# Patient Record
Sex: FEMALE | Race: WHITE | NOT HISPANIC OR LATINO | Employment: OTHER | URBAN - METROPOLITAN AREA
[De-identification: names, ages, dates, MRNs, and addresses within clinical notes are randomized per-mention and may not be internally consistent; named-entity substitution may affect disease eponyms.]

---

## 2017-02-17 ENCOUNTER — OFFICE VISIT - HEALTHEAST (OUTPATIENT)
Dept: FAMILY MEDICINE | Facility: CLINIC | Age: 77
End: 2017-02-17

## 2017-02-17 DIAGNOSIS — I10 ESSENTIAL HYPERTENSION, BENIGN: ICD-10-CM

## 2017-02-17 DIAGNOSIS — J06.9 URI (UPPER RESPIRATORY INFECTION): ICD-10-CM

## 2017-05-20 ENCOUNTER — COMMUNICATION - HEALTHEAST (OUTPATIENT)
Dept: FAMILY MEDICINE | Facility: CLINIC | Age: 77
End: 2017-05-20

## 2017-05-20 DIAGNOSIS — I10 UNSPECIFIED ESSENTIAL HYPERTENSION: ICD-10-CM

## 2017-05-20 DIAGNOSIS — I10 HTN (HYPERTENSION): ICD-10-CM

## 2017-05-23 ENCOUNTER — COMMUNICATION - HEALTHEAST (OUTPATIENT)
Dept: FAMILY MEDICINE | Facility: CLINIC | Age: 77
End: 2017-05-23

## 2017-06-14 ENCOUNTER — OFFICE VISIT - HEALTHEAST (OUTPATIENT)
Dept: FAMILY MEDICINE | Facility: CLINIC | Age: 77
End: 2017-06-14

## 2017-06-14 DIAGNOSIS — M81.0 OSTEOPOROSIS: ICD-10-CM

## 2017-06-14 DIAGNOSIS — I10 ESSENTIAL HYPERTENSION, BENIGN: ICD-10-CM

## 2017-06-14 DIAGNOSIS — Z85.3 HISTORY OF BREAST CANCER: ICD-10-CM

## 2017-06-14 DIAGNOSIS — K21.9 ESOPHAGEAL REFLUX: ICD-10-CM

## 2017-06-14 DIAGNOSIS — E78.00 PURE HYPERCHOLESTEROLEMIA: ICD-10-CM

## 2017-06-14 DIAGNOSIS — Z12.11 COLON CANCER SCREENING: ICD-10-CM

## 2017-06-14 ASSESSMENT — MIFFLIN-ST. JEOR: SCORE: 1167.16

## 2017-07-25 ENCOUNTER — RECORDS - HEALTHEAST (OUTPATIENT)
Dept: ADMINISTRATIVE | Facility: OTHER | Age: 77
End: 2017-07-25

## 2017-08-04 ENCOUNTER — COMMUNICATION - HEALTHEAST (OUTPATIENT)
Dept: FAMILY MEDICINE | Facility: CLINIC | Age: 77
End: 2017-08-04

## 2017-08-10 ENCOUNTER — AMBULATORY - HEALTHEAST (OUTPATIENT)
Dept: FAMILY MEDICINE | Facility: CLINIC | Age: 77
End: 2017-08-10

## 2017-08-10 DIAGNOSIS — Z12.11 COLON CANCER SCREENING: ICD-10-CM

## 2017-08-23 ENCOUNTER — COMMUNICATION - HEALTHEAST (OUTPATIENT)
Dept: FAMILY MEDICINE | Facility: CLINIC | Age: 77
End: 2017-08-23

## 2017-08-23 DIAGNOSIS — I10 UNSPECIFIED ESSENTIAL HYPERTENSION: ICD-10-CM

## 2017-08-23 DIAGNOSIS — I10 HTN (HYPERTENSION): ICD-10-CM

## 2017-10-04 ENCOUNTER — RECORDS - HEALTHEAST (OUTPATIENT)
Dept: ADMINISTRATIVE | Facility: OTHER | Age: 77
End: 2017-10-04

## 2017-10-11 ENCOUNTER — AMBULATORY - HEALTHEAST (OUTPATIENT)
Dept: NURSING | Facility: CLINIC | Age: 77
End: 2017-10-11

## 2017-10-11 DIAGNOSIS — Z00.00 HEALTH CARE MAINTENANCE: ICD-10-CM

## 2018-01-24 ENCOUNTER — OFFICE VISIT - HEALTHEAST (OUTPATIENT)
Dept: FAMILY MEDICINE | Facility: CLINIC | Age: 78
End: 2018-01-24

## 2018-01-24 DIAGNOSIS — E78.00 PURE HYPERCHOLESTEROLEMIA: ICD-10-CM

## 2018-01-24 DIAGNOSIS — M16.11 PRIMARY OSTEOARTHRITIS OF RIGHT HIP: ICD-10-CM

## 2018-01-24 DIAGNOSIS — I10 ESSENTIAL HYPERTENSION, BENIGN: ICD-10-CM

## 2018-01-24 DIAGNOSIS — K21.9 ESOPHAGEAL REFLUX: ICD-10-CM

## 2018-01-24 DIAGNOSIS — M81.0 OSTEOPOROSIS: ICD-10-CM

## 2018-01-24 DIAGNOSIS — H40.9 GLAUCOMA: ICD-10-CM

## 2018-01-24 DIAGNOSIS — Z01.818 PREOP GENERAL PHYSICAL EXAM: ICD-10-CM

## 2018-01-24 LAB
ANION GAP SERPL CALCULATED.3IONS-SCNC: 9 MMOL/L (ref 5–18)
BUN SERPL-MCNC: 19 MG/DL (ref 8–28)
CALCIUM SERPL-MCNC: 10.3 MG/DL (ref 8.5–10.5)
CHLORIDE BLD-SCNC: 102 MMOL/L (ref 98–107)
CO2 SERPL-SCNC: 30 MMOL/L (ref 22–31)
CREAT SERPL-MCNC: 1.04 MG/DL (ref 0.6–1.1)
ERYTHROCYTE [DISTWIDTH] IN BLOOD BY AUTOMATED COUNT: 13.4 % (ref 11–14.5)
GFR SERPL CREATININE-BSD FRML MDRD: 52 ML/MIN/1.73M2
GLUCOSE BLD-MCNC: 92 MG/DL (ref 70–125)
HCT VFR BLD AUTO: 39.2 % (ref 35–47)
HGB BLD-MCNC: 13.2 G/DL (ref 12–16)
MCH RBC QN AUTO: 29.4 PG (ref 27–34)
MCHC RBC AUTO-ENTMCNC: 33.6 G/DL (ref 32–36)
MCV RBC AUTO: 87 FL (ref 80–100)
PLATELET # BLD AUTO: 290 THOU/UL (ref 140–440)
PMV BLD AUTO: 7.4 FL (ref 7–10)
POTASSIUM BLD-SCNC: 4.5 MMOL/L (ref 3.5–5)
RBC # BLD AUTO: 4.49 MILL/UL (ref 3.8–5.4)
SODIUM SERPL-SCNC: 141 MMOL/L (ref 136–145)
WBC: 6.1 THOU/UL (ref 4–11)

## 2018-01-24 ASSESSMENT — MIFFLIN-ST. JEOR: SCORE: 1182.59

## 2018-01-25 LAB
ATRIAL RATE - MUSE: 62 BPM
DIASTOLIC BLOOD PRESSURE - MUSE: NORMAL MMHG
INTERPRETATION ECG - MUSE: NORMAL
P AXIS - MUSE: 44 DEGREES
PR INTERVAL - MUSE: 178 MS
QRS DURATION - MUSE: 84 MS
QT - MUSE: 390 MS
QTC - MUSE: 395 MS
R AXIS - MUSE: 36 DEGREES
SYSTOLIC BLOOD PRESSURE - MUSE: NORMAL MMHG
T AXIS - MUSE: 37 DEGREES
VENTRICULAR RATE- MUSE: 62 BPM

## 2018-02-19 ENCOUNTER — COMMUNICATION - HEALTHEAST (OUTPATIENT)
Dept: FAMILY MEDICINE | Facility: CLINIC | Age: 78
End: 2018-02-19

## 2018-02-19 DIAGNOSIS — I10 HTN (HYPERTENSION): ICD-10-CM

## 2018-02-19 DIAGNOSIS — I10 ESSENTIAL HYPERTENSION: ICD-10-CM

## 2018-05-19 ENCOUNTER — COMMUNICATION - HEALTHEAST (OUTPATIENT)
Dept: FAMILY MEDICINE | Facility: CLINIC | Age: 78
End: 2018-05-19

## 2018-05-19 DIAGNOSIS — I10 HTN (HYPERTENSION): ICD-10-CM

## 2018-05-19 DIAGNOSIS — I10 ESSENTIAL HYPERTENSION: ICD-10-CM

## 2018-08-20 ENCOUNTER — COMMUNICATION - HEALTHEAST (OUTPATIENT)
Dept: FAMILY MEDICINE | Facility: CLINIC | Age: 78
End: 2018-08-20

## 2018-08-20 DIAGNOSIS — I10 ESSENTIAL HYPERTENSION: ICD-10-CM

## 2018-08-20 DIAGNOSIS — I10 HTN (HYPERTENSION): ICD-10-CM

## 2018-09-18 ENCOUNTER — AMBULATORY - HEALTHEAST (OUTPATIENT)
Dept: NURSING | Facility: CLINIC | Age: 78
End: 2018-09-18

## 2018-09-18 DIAGNOSIS — Z00.00 ROUTINE GENERAL MEDICAL EXAMINATION AT A HEALTH CARE FACILITY: ICD-10-CM

## 2018-11-19 ENCOUNTER — COMMUNICATION - HEALTHEAST (OUTPATIENT)
Dept: FAMILY MEDICINE | Facility: CLINIC | Age: 78
End: 2018-11-19

## 2018-11-19 DIAGNOSIS — I10 ESSENTIAL HYPERTENSION: ICD-10-CM

## 2018-11-19 DIAGNOSIS — I10 HTN (HYPERTENSION): ICD-10-CM

## 2018-12-20 ENCOUNTER — OFFICE VISIT - HEALTHEAST (OUTPATIENT)
Dept: FAMILY MEDICINE | Facility: CLINIC | Age: 78
End: 2018-12-20

## 2018-12-20 ENCOUNTER — COMMUNICATION - HEALTHEAST (OUTPATIENT)
Dept: FAMILY MEDICINE | Facility: CLINIC | Age: 78
End: 2018-12-20

## 2018-12-20 DIAGNOSIS — M25.511 ACUTE PAIN OF RIGHT SHOULDER: ICD-10-CM

## 2018-12-20 DIAGNOSIS — Z02.6 ENCOUNTER RELATED TO WORKER'S COMPENSATION CLAIM: ICD-10-CM

## 2019-01-16 ENCOUNTER — OFFICE VISIT - HEALTHEAST (OUTPATIENT)
Dept: PHYSICAL THERAPY | Facility: REHABILITATION | Age: 79
End: 2019-01-16

## 2019-01-16 ENCOUNTER — OFFICE VISIT - HEALTHEAST (OUTPATIENT)
Dept: FAMILY MEDICINE | Facility: CLINIC | Age: 79
End: 2019-01-16

## 2019-01-16 DIAGNOSIS — Z02.6 ENCOUNTER RELATED TO WORKER'S COMPENSATION CLAIM: ICD-10-CM

## 2019-01-16 DIAGNOSIS — K21.9 GASTROESOPHAGEAL REFLUX DISEASE WITHOUT ESOPHAGITIS: ICD-10-CM

## 2019-01-16 DIAGNOSIS — H40.9 GLAUCOMA, UNSPECIFIED GLAUCOMA TYPE, UNSPECIFIED LATERALITY: ICD-10-CM

## 2019-01-16 DIAGNOSIS — Z01.818 PREOP GENERAL PHYSICAL EXAM: ICD-10-CM

## 2019-01-16 DIAGNOSIS — E78.00 PURE HYPERCHOLESTEROLEMIA: ICD-10-CM

## 2019-01-16 DIAGNOSIS — M16.11 PRIMARY OSTEOARTHRITIS OF RIGHT HIP: ICD-10-CM

## 2019-01-16 DIAGNOSIS — M25.511 ACUTE PAIN OF RIGHT SHOULDER: ICD-10-CM

## 2019-01-16 DIAGNOSIS — M81.0 OSTEOPOROSIS, UNSPECIFIED OSTEOPOROSIS TYPE, UNSPECIFIED PATHOLOGICAL FRACTURE PRESENCE: ICD-10-CM

## 2019-01-16 DIAGNOSIS — I10 ESSENTIAL HYPERTENSION, BENIGN: ICD-10-CM

## 2019-01-16 LAB
ANION GAP SERPL CALCULATED.3IONS-SCNC: 11 MMOL/L (ref 5–18)
BUN SERPL-MCNC: 24 MG/DL (ref 8–28)
CALCIUM SERPL-MCNC: 9.8 MG/DL (ref 8.5–10.5)
CHLORIDE BLD-SCNC: 104 MMOL/L (ref 98–107)
CO2 SERPL-SCNC: 27 MMOL/L (ref 22–31)
CREAT SERPL-MCNC: 0.94 MG/DL (ref 0.6–1.1)
ERYTHROCYTE [DISTWIDTH] IN BLOOD BY AUTOMATED COUNT: 12.5 % (ref 11–14.5)
GFR SERPL CREATININE-BSD FRML MDRD: 58 ML/MIN/1.73M2
GLUCOSE BLD-MCNC: 80 MG/DL (ref 70–125)
HCT VFR BLD AUTO: 38.5 % (ref 35–47)
HGB BLD-MCNC: 12.7 G/DL (ref 12–16)
MCH RBC QN AUTO: 29 PG (ref 27–34)
MCHC RBC AUTO-ENTMCNC: 33 G/DL (ref 32–36)
MCV RBC AUTO: 88 FL (ref 80–100)
PLATELET # BLD AUTO: 294 THOU/UL (ref 140–440)
PMV BLD AUTO: 7.2 FL (ref 7–10)
POTASSIUM BLD-SCNC: 3.4 MMOL/L (ref 3.5–5)
RBC # BLD AUTO: 4.37 MILL/UL (ref 3.8–5.4)
SODIUM SERPL-SCNC: 142 MMOL/L (ref 136–145)
WBC: 7.4 THOU/UL (ref 4–11)

## 2019-01-16 ASSESSMENT — MIFFLIN-ST. JEOR: SCORE: 1165.35

## 2019-01-17 LAB
ATRIAL RATE - MUSE: 66 BPM
DIASTOLIC BLOOD PRESSURE - MUSE: NORMAL MMHG
INTERPRETATION ECG - MUSE: NORMAL
P AXIS - MUSE: 50 DEGREES
PR INTERVAL - MUSE: 152 MS
QRS DURATION - MUSE: 92 MS
QT - MUSE: 396 MS
QTC - MUSE: 415 MS
R AXIS - MUSE: 59 DEGREES
SYSTOLIC BLOOD PRESSURE - MUSE: NORMAL MMHG
T AXIS - MUSE: 45 DEGREES
VENTRICULAR RATE- MUSE: 66 BPM

## 2019-01-20 ENCOUNTER — AMBULATORY - HEALTHEAST (OUTPATIENT)
Dept: FAMILY MEDICINE | Facility: CLINIC | Age: 79
End: 2019-01-20

## 2019-01-20 DIAGNOSIS — E87.6 HYPOKALEMIA: ICD-10-CM

## 2019-01-21 ENCOUNTER — COMMUNICATION - HEALTHEAST (OUTPATIENT)
Dept: FAMILY MEDICINE | Facility: CLINIC | Age: 79
End: 2019-01-21

## 2019-01-29 ASSESSMENT — MIFFLIN-ST. JEOR: SCORE: 1165.35

## 2019-01-31 ENCOUNTER — AMBULATORY - HEALTHEAST (OUTPATIENT)
Dept: LAB | Facility: CLINIC | Age: 79
End: 2019-01-31

## 2019-01-31 DIAGNOSIS — E87.6 HYPOKALEMIA: ICD-10-CM

## 2019-01-31 LAB — POTASSIUM BLD-SCNC: 4.5 MMOL/L (ref 3.5–5)

## 2019-02-06 ENCOUNTER — SURGERY - HEALTHEAST (OUTPATIENT)
Dept: SURGERY | Facility: CLINIC | Age: 79
End: 2019-02-06

## 2019-02-06 ENCOUNTER — ANESTHESIA - HEALTHEAST (OUTPATIENT)
Dept: SURGERY | Facility: CLINIC | Age: 79
End: 2019-02-06

## 2019-02-06 ASSESSMENT — MIFFLIN-ST. JEOR: SCORE: 1133.6

## 2019-02-12 ENCOUNTER — COMMUNICATION - HEALTHEAST (OUTPATIENT)
Dept: CARE COORDINATION | Facility: CLINIC | Age: 79
End: 2019-02-12

## 2019-02-13 ENCOUNTER — OFFICE VISIT - HEALTHEAST (OUTPATIENT)
Dept: FAMILY MEDICINE | Facility: CLINIC | Age: 79
End: 2019-02-13

## 2019-02-13 DIAGNOSIS — Z96.641 STATUS POST TOTAL REPLACEMENT OF RIGHT HIP: ICD-10-CM

## 2019-02-13 DIAGNOSIS — M16.11 PRIMARY OSTEOARTHRITIS OF RIGHT HIP: ICD-10-CM

## 2019-02-16 ENCOUNTER — COMMUNICATION - HEALTHEAST (OUTPATIENT)
Dept: FAMILY MEDICINE | Facility: CLINIC | Age: 79
End: 2019-02-16

## 2019-02-16 DIAGNOSIS — I10 ESSENTIAL HYPERTENSION: ICD-10-CM

## 2019-02-16 DIAGNOSIS — I10 HTN (HYPERTENSION): ICD-10-CM

## 2019-02-21 ENCOUNTER — RECORDS - HEALTHEAST (OUTPATIENT)
Dept: ADMINISTRATIVE | Facility: OTHER | Age: 79
End: 2019-02-21

## 2019-03-13 ENCOUNTER — OFFICE VISIT - HEALTHEAST (OUTPATIENT)
Dept: FAMILY MEDICINE | Facility: CLINIC | Age: 79
End: 2019-03-13

## 2019-03-13 DIAGNOSIS — Z02.6 ENCOUNTER RELATED TO WORKER'S COMPENSATION CLAIM: ICD-10-CM
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
## 2019-03-13 DIAGNOSIS — M25.511 ACUTE PAIN OF RIGHT SHOULDER: ICD-10-CM

## 2019-03-13 ASSESSMENT — MIFFLIN-ST. JEOR: SCORE: 1169.89

## 2019-03-21 ENCOUNTER — RECORDS - HEALTHEAST (OUTPATIENT)
Dept: ADMINISTRATIVE | Facility: OTHER | Age: 79
End: 2019-03-21

## 2019-04-11 ENCOUNTER — OFFICE VISIT - HEALTHEAST (OUTPATIENT)
Dept: FAMILY MEDICINE | Facility: CLINIC | Age: 79
End: 2019-04-11

## 2019-04-11 DIAGNOSIS — Z01.818 PRE-OPERATIVE EXAMINATION: ICD-10-CM

## 2019-04-11 DIAGNOSIS — E78.00 PURE HYPERCHOLESTEROLEMIA: ICD-10-CM

## 2019-04-11 DIAGNOSIS — H25.9 AGE-RELATED CATARACT OF BOTH EYES, UNSPECIFIED AGE-RELATED CATARACT TYPE: ICD-10-CM

## 2019-04-11 DIAGNOSIS — I10 ESSENTIAL HYPERTENSION: ICD-10-CM

## 2019-04-11 LAB
ANION GAP SERPL CALCULATED.3IONS-SCNC: 7 MMOL/L (ref 5–18)
BUN SERPL-MCNC: 15 MG/DL (ref 8–28)
CALCIUM SERPL-MCNC: 10.6 MG/DL (ref 8.5–10.5)
CHLORIDE BLD-SCNC: 102 MMOL/L (ref 98–107)
CO2 SERPL-SCNC: 32 MMOL/L (ref 22–31)
CREAT SERPL-MCNC: 0.95 MG/DL (ref 0.6–1.1)
GFR SERPL CREATININE-BSD FRML MDRD: 57 ML/MIN/1.73M2
GLUCOSE BLD-MCNC: 87 MG/DL (ref 70–125)
HGB BLD-MCNC: 12.1 G/DL (ref 12–16)
POTASSIUM BLD-SCNC: 4.2 MMOL/L (ref 3.5–5)
SODIUM SERPL-SCNC: 141 MMOL/L (ref 136–145)

## 2019-04-11 ASSESSMENT — MIFFLIN-ST. JEOR: SCORE: 1156.28

## 2019-04-16 ENCOUNTER — RECORDS - HEALTHEAST (OUTPATIENT)
Dept: ADMINISTRATIVE | Facility: OTHER | Age: 79
End: 2019-04-16

## 2019-12-09 ENCOUNTER — AMBULATORY - HEALTHEAST (OUTPATIENT)
Dept: OTHER | Facility: CLINIC | Age: 79
End: 2019-12-09

## 2020-03-01 ENCOUNTER — COMMUNICATION - HEALTHEAST (OUTPATIENT)
Dept: FAMILY MEDICINE | Facility: CLINIC | Age: 80
End: 2020-03-01

## 2020-03-01 DIAGNOSIS — I10 ESSENTIAL HYPERTENSION: ICD-10-CM

## 2020-04-18 ENCOUNTER — COMMUNICATION - HEALTHEAST (OUTPATIENT)
Dept: FAMILY MEDICINE | Facility: CLINIC | Age: 80
End: 2020-04-18

## 2020-04-18 DIAGNOSIS — I10 HTN (HYPERTENSION): ICD-10-CM

## 2020-05-26 ENCOUNTER — COMMUNICATION - HEALTHEAST (OUTPATIENT)
Dept: FAMILY MEDICINE | Facility: CLINIC | Age: 80
End: 2020-05-26

## 2020-05-26 DIAGNOSIS — I10 ESSENTIAL HYPERTENSION: ICD-10-CM

## 2020-05-28 ENCOUNTER — COMMUNICATION - HEALTHEAST (OUTPATIENT)
Dept: FAMILY MEDICINE | Facility: CLINIC | Age: 80
End: 2020-05-28

## 2020-05-28 DIAGNOSIS — I10 ESSENTIAL HYPERTENSION: ICD-10-CM

## 2020-05-28 DIAGNOSIS — I10 HTN (HYPERTENSION): ICD-10-CM

## 2020-05-29 ENCOUNTER — OFFICE VISIT - HEALTHEAST (OUTPATIENT)
Dept: FAMILY MEDICINE | Facility: CLINIC | Age: 80
End: 2020-05-29

## 2020-05-29 DIAGNOSIS — E78.00 PURE HYPERCHOLESTEROLEMIA: ICD-10-CM

## 2020-05-29 DIAGNOSIS — I10 ESSENTIAL HYPERTENSION, BENIGN: ICD-10-CM

## 2020-05-29 RX ORDER — HYDROCHLOROTHIAZIDE 12.5 MG/1
CAPSULE ORAL
Qty: 90 CAPSULE | Refills: 2 | Status: SHIPPED | OUTPATIENT
Start: 2020-05-29 | End: 2022-11-10

## 2020-11-02 ENCOUNTER — COMMUNICATION - HEALTHEAST (OUTPATIENT)
Dept: SCHEDULING | Facility: CLINIC | Age: 80
End: 2020-11-02

## 2020-11-10 ENCOUNTER — AMBULATORY - HEALTHEAST (OUTPATIENT)
Dept: NURSING | Facility: CLINIC | Age: 80
End: 2020-11-10

## 2021-01-27 ENCOUNTER — OFFICE VISIT - HEALTHEAST (OUTPATIENT)
Dept: FAMILY MEDICINE | Facility: CLINIC | Age: 81
End: 2021-01-27

## 2021-01-27 DIAGNOSIS — Z00.00 MEDICARE ANNUAL WELLNESS VISIT, SUBSEQUENT: ICD-10-CM

## 2021-01-27 DIAGNOSIS — M25.551 HIP PAIN, RIGHT: ICD-10-CM

## 2021-01-27 DIAGNOSIS — Z85.3 HISTORY OF BREAST CANCER: ICD-10-CM

## 2021-01-27 DIAGNOSIS — K21.9 GASTROESOPHAGEAL REFLUX DISEASE WITHOUT ESOPHAGITIS: ICD-10-CM

## 2021-01-27 DIAGNOSIS — Z11.59 ENCOUNTER FOR HCV SCREENING TEST FOR LOW RISK PATIENT: ICD-10-CM

## 2021-01-27 DIAGNOSIS — M81.0 OSTEOPOROSIS, UNSPECIFIED OSTEOPOROSIS TYPE, UNSPECIFIED PATHOLOGICAL FRACTURE PRESENCE: ICD-10-CM

## 2021-01-27 DIAGNOSIS — I10 ESSENTIAL HYPERTENSION, BENIGN: ICD-10-CM

## 2021-01-27 DIAGNOSIS — R06.09 EXERTIONAL DYSPNEA: ICD-10-CM

## 2021-01-27 DIAGNOSIS — E78.00 PURE HYPERCHOLESTEROLEMIA: ICD-10-CM

## 2021-01-27 DIAGNOSIS — H40.9 GLAUCOMA, UNSPECIFIED GLAUCOMA TYPE, UNSPECIFIED LATERALITY: ICD-10-CM

## 2021-01-27 LAB
ALBUMIN SERPL-MCNC: 4.6 G/DL (ref 3.5–5)
ALP SERPL-CCNC: 64 U/L (ref 45–120)
ALT SERPL W P-5'-P-CCNC: 29 U/L (ref 0–45)
ANION GAP SERPL CALCULATED.3IONS-SCNC: 12 MMOL/L (ref 5–18)
AST SERPL W P-5'-P-CCNC: 34 U/L (ref 0–40)
BILIRUB SERPL-MCNC: 0.5 MG/DL (ref 0–1)
BUN SERPL-MCNC: 25 MG/DL (ref 8–28)
CALCIUM SERPL-MCNC: 9.8 MG/DL (ref 8.5–10.5)
CHLORIDE BLD-SCNC: 102 MMOL/L (ref 98–107)
CHOLEST SERPL-MCNC: 208 MG/DL
CO2 SERPL-SCNC: 26 MMOL/L (ref 22–31)
CREAT SERPL-MCNC: 1.11 MG/DL (ref 0.6–1.1)
ERYTHROCYTE [DISTWIDTH] IN BLOOD BY AUTOMATED COUNT: 12.5 % (ref 11–14.5)
FASTING STATUS PATIENT QL REPORTED: YES
GFR SERPL CREATININE-BSD FRML MDRD: 47 ML/MIN/1.73M2
GLUCOSE BLD-MCNC: 99 MG/DL (ref 70–125)
HCT VFR BLD AUTO: 38.7 % (ref 35–47)
HDLC SERPL-MCNC: 73 MG/DL
HGB BLD-MCNC: 12.7 G/DL (ref 12–16)
LDLC SERPL CALC-MCNC: 120 MG/DL
MCH RBC QN AUTO: 29 PG (ref 27–34)
MCHC RBC AUTO-ENTMCNC: 32.8 G/DL (ref 32–36)
MCV RBC AUTO: 88 FL (ref 80–100)
PLATELET # BLD AUTO: 326 THOU/UL (ref 140–440)
PMV BLD AUTO: 7.3 FL (ref 7–10)
POTASSIUM BLD-SCNC: 4.2 MMOL/L (ref 3.5–5)
PROT SERPL-MCNC: 7.8 G/DL (ref 6–8)
RBC # BLD AUTO: 4.38 MILL/UL (ref 3.8–5.4)
SODIUM SERPL-SCNC: 140 MMOL/L (ref 136–145)
TRIGL SERPL-MCNC: 74 MG/DL
WBC: 6.4 THOU/UL (ref 4–11)

## 2021-01-27 ASSESSMENT — MIFFLIN-ST. JEOR: SCORE: 1119.99

## 2021-01-28 LAB
25(OH)D3 SERPL-MCNC: 41.9 NG/ML (ref 30–80)
ATRIAL RATE - MUSE: 69 BPM
DIASTOLIC BLOOD PRESSURE - MUSE: NORMAL
INTERPRETATION ECG - MUSE: NORMAL
P AXIS - MUSE: 39 DEGREES
PR INTERVAL - MUSE: 170 MS
QRS DURATION - MUSE: 82 MS
QT - MUSE: 408 MS
QTC - MUSE: 437 MS
R AXIS - MUSE: 9 DEGREES
SYSTOLIC BLOOD PRESSURE - MUSE: NORMAL
T AXIS - MUSE: 12 DEGREES
VENTRICULAR RATE- MUSE: 69 BPM

## 2021-01-29 LAB — HCV AB SERPL QL IA: NEGATIVE

## 2021-02-12 ENCOUNTER — AMBULATORY - HEALTHEAST (OUTPATIENT)
Dept: NURSING | Facility: CLINIC | Age: 81
End: 2021-02-12

## 2021-03-05 ENCOUNTER — AMBULATORY - HEALTHEAST (OUTPATIENT)
Dept: NURSING | Facility: CLINIC | Age: 81
End: 2021-03-05

## 2021-04-16 ENCOUNTER — COMMUNICATION - HEALTHEAST (OUTPATIENT)
Dept: FAMILY MEDICINE | Facility: CLINIC | Age: 81
End: 2021-04-16

## 2021-04-16 DIAGNOSIS — I10 ESSENTIAL HYPERTENSION, BENIGN: ICD-10-CM

## 2021-04-17 RX ORDER — IRBESARTAN 150 MG/1
150 TABLET ORAL DAILY
Qty: 90 TABLET | Refills: 3 | Status: SHIPPED | OUTPATIENT
Start: 2021-04-17 | End: 2022-03-25

## 2021-05-04 ENCOUNTER — COMMUNICATION - HEALTHEAST (OUTPATIENT)
Dept: FAMILY MEDICINE | Facility: CLINIC | Age: 81
End: 2021-05-04

## 2021-05-27 VITALS — DIASTOLIC BLOOD PRESSURE: 82 MMHG | SYSTOLIC BLOOD PRESSURE: 146 MMHG

## 2021-05-27 NOTE — PROGRESS NOTES
Preoperative Exam    Scheduled Procedure: cataract removal   Surgery Date:  Right eye 4/16/2019 , left eye 4/23/2019  Surgery Location: [facility] [place]   fax [phone]    Surgeon:  DR René Clay    Assessment/Plan:     Lidya was seen today for pre-op exam.    Pre-operative examination  -     Basic Metabolic Panel  -     Hemoglobin  -No contraindications or significant risk factors for planned procedure.  Current chronic medical conditions are stable.  Okay to proceed with procedure as planned    Age-related cataract of both eyes, unspecified age-related cataract type  No contraindications or significant risk factors for planned procedure.  Current chronic medical conditions are stable.  Okay to proceed with procedure as planned    Hypercholesterolemia  Medically managed without need for medication    Essential hypertension  Controlled with current medications.  We will continue current dose of hydrochlorothiazide and irbesartan.      Surgical Procedure Risk: Low (reported cardiac risk generally < 1%)  Have you had prior anesthesia?: Yes  Have you or any family members had a previous anesthesia reaction:  No  Do you or any family members have a history of a clotting or bleeding disorder?: No  Cardiac Risk Assessment: no increased risk for major cardiac complications    Patient approved for surgery with general or local anesthesia.      Functional Status: Independent  Patient plans to recover at home with family.     Subjective:      Lidya Dejesus is a 78 y.o. female who presents for a preoperative consultation.  She has bilateral cataracts that are causing decreased vision.  She is planning to have bilateral cataract surgery.  She has history of glaucoma.  She has history of hypertension as well as hyperlipidemia.  Recently underwent right total hip replacement.  Has been doing extremely well since this procedure.  She is very pleased with results.  Has not yet returned to work but is looking  forward to returning to work next week.  Hypertension is well controlled with current medications of irbesartan and hydrochlorothiazide.  Does not take any medication to manage hyperlipidemia.  She is on drops for treatment of glaucoma.  She has prior history of breast cancer.  Review of systems is assessed and is otherwise negative.  No questions today.    All other systems reviewed and are negative, other than those listed in the HPI.    Pertinent History  Do you have difficulty breathing or chest pain after walking up a flight of stairs: No  History of obstructive sleep apnea: No  Steroid use in the last 6 months: No  Frequent Aspirin/NSAID use: No  Prior Blood Transfusion: No  Prior Blood Transfusion Reaction: No  If for some reason prior to, during or after the procedure, if it is medically indicated, would you be willing to have a blood transfusion?:  There is no transfusion refusal.    Current Outpatient Medications   Medication Sig Dispense Refill     bimatoprost (LUMIGAN) 0.01 % Drop Administer 1 drop to both eyes at bedtime.              hydroCHLOROthiazide (MICROZIDE) 12.5 mg capsule TAKE 1 CAPSULE BY MOUTH EVERY DAY 90 capsule 3     irbesartan (AVAPRO) 150 MG tablet TAKE 1 TABLET BY MOUTH EVERY DAY 90 tablet 3     LOTEMAX 0.5 % DrpG STARTING AFTER SURGERY, PLACE 1 DROP IN RIGHT EYE 3 TIMES PER DAY. USE UNTIL GONE.  1     moxifloxacin (VIGAMOX) 0.5 % ophthalmic solution STARTING AFTER SURGERY, PLACE 1 DROP IN RIGHT EYE 3 TIMES PER DAY FOR 2 WEEKS ONLY  1     No current facility-administered medications for this visit.         Allergies   Allergen Reactions     Ace Inhibitors Cough     Combigan [Brimonidine-Timolol] Other (See Comments)     Reddened eyes       Patient Active Problem List   Diagnosis     Hypercholesterolemia     Glaucoma     Benign Essential Hypertension     Esophageal Reflux     Osteoporosis     Primary osteoarthritis of right hip     History of breast cancer     OA (osteoarthritis) of hip      Status post total replacement of right hip     Hypertension       Past Medical History:   Diagnosis Date     Cancer (H)     breast     Esophageal reflux      Glaucoma      Hypertension      Osteoporosis        Past Surgical History:   Procedure Laterality Date     MASTECTOMY  1990     [place] SHAR[place] HOLE EVAC SUBDUR/EXTRA HEMATOMA      Description: [place] Holes For Evacuation Of Subdural Hematoma;  Recorded: 10/23/2008;     TX REMOVAL OF TONSILS,<11 Y/O      Description: Tonsillectomy;  Recorded: 10/23/2008;     TX TOTAL HIP ARTHROPLASTY Right 2/6/2019    Procedure: RIGHT TOTAL HIP ARTHROPLASTY DIRECT ANTERIOR APPROACH;  Surgeon: Luan Gerber MD;  Location: [facility];  Service: Orthopedics       Social History     Socioeconomic History     Marital status: Single     Spouse name: Not on file     Number of children: 2     Years of education: Not on file     Highest education level: Not on file   Occupational History     Occupation: Retail     Employer: [employer]     Comment: part time     Occupation: Retired     Employer: [employer]      Social Needs     Financial resource strain: Not on file     Food insecurity:     Worry: Not on file     Inability: Not on file     Transportation needs:     Medical: Not on file     Non-medical: Not on file   Tobacco Use     Smoking status: Never Smoker     Smokeless tobacco: Never Used   Substance and Sexual Activity     Alcohol use: No     Drug use: No     Sexual activity: Not on file   Lifestyle     Physical activity:     Days per week: Not on file     Minutes per session: Not on file     Stress: Not on file   Relationships     Social connections:     Talks on phone: Not on file     Gets together: Not on file     Attends Quaker service: Not on file     Active member of club or organization: Not on file     Attends meetings of clubs or organizations: Not on file     Relationship status: Not on file     Intimate partner violence:     Fear of current or ex  "partner: Not on file     Emotionally abused: Not on file     Physically abused: Not on file     Forced sexual activity: Not on file   Other Topics Concern     Not on file   Social History Narrative     Not on file       Patient Care Team:  Jailyn Chawla MD as PCP - General  Luan Gerber MD as Physician (Orthopedic Surgery)          Objective:     Vitals:    04/11/19 0938   BP: 120/78   Pulse: 85   Temp: 98.1  F (36.7  C)   TempSrc: Oral   SpO2: 99%   Weight: 164 lb (74.4 kg)   Height: 5' 1\" (1.549 m)         Physical Exam:  Physical Exam  Physical Examination: General appearance - alert, well appearing, and in no distress  Mental status - alert, oriented to person, place, and time  Eyes - pupils equal and reactive, extraocular eye movements intact, wears glasses  Ears - bilateral TM's and external ear canals normal  Nose - normal and patent, no erythema, discharge or polyps  Mouth - mucous membranes moist, pharynx normal without lesions  Neck - supple, no significant adenopathy  Chest - clear to auscultation, no wheezes, rales or rhonchi, symmetric air entry  Heart - normal rate, regular rhythm, normal S1, S2, no murmurs, rubs, clicks or gallops  Abdomen - normal  Neurological - alert, oriented, normal speech, no focal findings or movement disorder noted  Extremities - peripheral pulses normal, no pedal edema, no clubbing or cyanosis    There are no Patient Instructions on file for this visit.    EKG: EKG results from January 16, 2019 reviewed.  This showed normal sinus rhythm with nonspecific ST abnormality.  No other significant findings.    Labs:  Labs pending at this time.  Results will be reviewed when available.    Immunization History   Administered Date(s) Administered     DT (pediatric) 09/22/2000     Hep A, historic 10/16/2007, 10/16/2008     Influenza high dose, seasonal 09/30/2015, 10/03/2016, 10/11/2017, 09/18/2018     Influenza, inj, historic,unspecified 10/16/2008, 09/25/2014     " Influenza, seasonal,quad inj 6-35 mos 09/24/2009, 09/12/2010, 09/26/2011, 10/16/2012, 10/15/2013     Pneumo Conj 13-V (2010&after) 07/22/2015     Pneumo Polysac 23-V 11/16/2006     Td,adult,historic,unspecified 09/22/2000     Tdap 11/06/2013     ZOSTER, LIVE 10/16/2007           Electronically signed by Ila Choi MD 04/11/19 9:40 AM

## 2021-05-28 ENCOUNTER — RECORDS - HEALTHEAST (OUTPATIENT)
Dept: ADMINISTRATIVE | Facility: CLINIC | Age: 81
End: 2021-05-28

## 2021-05-30 VITALS — BODY MASS INDEX: 28.17 KG/M2 | WEIGHT: 159 LBS

## 2021-05-31 VITALS — WEIGHT: 159.4 LBS | HEIGHT: 63 IN | BODY MASS INDEX: 28.24 KG/M2

## 2021-05-31 VITALS — BODY MASS INDEX: 28.84 KG/M2 | HEIGHT: 63 IN | WEIGHT: 162.8 LBS

## 2021-06-02 VITALS — WEIGHT: 152 LBS | BODY MASS INDEX: 26.93 KG/M2 | HEIGHT: 63 IN

## 2021-06-02 VITALS — WEIGHT: 157.38 LBS | BODY MASS INDEX: 27.88 KG/M2

## 2021-06-02 VITALS — HEIGHT: 63 IN | WEIGHT: 159 LBS | BODY MASS INDEX: 28.17 KG/M2

## 2021-06-02 VITALS — WEIGHT: 160 LBS | HEIGHT: 63 IN | BODY MASS INDEX: 28.35 KG/M2

## 2021-06-02 VITALS — WEIGHT: 164 LBS | HEIGHT: 61 IN | BODY MASS INDEX: 30.96 KG/M2

## 2021-06-02 VITALS — WEIGHT: 157 LBS | BODY MASS INDEX: 27.81 KG/M2

## 2021-06-04 VITALS — WEIGHT: 150.5 LBS | BODY MASS INDEX: 28.44 KG/M2

## 2021-06-05 VITALS
HEIGHT: 61 IN | SYSTOLIC BLOOD PRESSURE: 156 MMHG | WEIGHT: 156 LBS | BODY MASS INDEX: 29.45 KG/M2 | RESPIRATION RATE: 20 BRPM | TEMPERATURE: 97.1 F | OXYGEN SATURATION: 99 % | HEART RATE: 76 BPM | DIASTOLIC BLOOD PRESSURE: 70 MMHG

## 2021-06-06 NOTE — TELEPHONE ENCOUNTER
Refill Approved    Rx renewed per Medication Renewal Policy. Medication was last renewed on 0219/2019    Shannan Arias, [facility] Triage/Med Refill 3/2/2020     Requested Prescriptions   Pending Prescriptions Disp Refills     hydroCHLOROthiazide (MICROZIDE) 12.5 mg capsule [Pharmacy Med Name: HYDROCHLOROTHIAZIDE 12.5 MG CP] 90 capsule 3     Sig: TAKE 1 CAPSULE BY MOUTH EVERY DAY       Diuretics/Combination Diuretics Refill Protocol  Passed - 3/1/2020  8:04 AM        Passed - Visit with PCP or prescribing provider visit in past 12 months     Last office visit with prescriber/PCP: 2/13/2019 Jailyn Chawla MD OR same dept: 3/13/2019 Elena Escalante MD OR same specialty: 3/13/2019 Elena Escalante MD  Last physical: 1/16/2019 Last MTM visit: Visit date not found   Next visit within 3 mo: Visit date not found  Next physical within 3 mo: Visit date not found  Prescriber OR PCP: Jailyn Chawla MD  Last diagnosis associated with med order: 1. Essential hypertension  - hydroCHLOROthiazide (MICROZIDE) 12.5 mg capsule [Pharmacy Med Name: HYDROCHLOROTHIAZIDE 12.5 MG CP]; TAKE 1 CAPSULE BY MOUTH EVERY DAY  Dispense: 90 capsule; Refill: 3    If protocol passes may refill for 12 months if within 3 months of last provider visit (or a total of 15 months).             Passed - Serum Potassium in past 12 months      Lab Results   Component Value Date    Potassium 4.2 04/11/2019             Passed - Serum Sodium in past 12 months      Lab Results   Component Value Date    Sodium 141 04/11/2019             Passed - Blood pressure on file in past 12 months     BP Readings from Last 1 Encounters:   04/11/19 120/78             Passed - Serum Creatinine in past 12 months      Creatinine   Date Value Ref Range Status   04/11/2019 0.95 0.60 - 1.10 mg/dL Final

## 2021-06-07 NOTE — TELEPHONE ENCOUNTER
RN cannot approve Refill Request    RN can NOT refill this medication PCP messaged that patient is overdue for Labs and Office Visit. Last office visit: 2/13/2019 Jailyn Chawla MD Last Physical: 1/16/2019 Last MTM visit: Visit date not found Last visit same specialty: 3/13/2019 Elena Escalante MD.  Next visit within 3 mo: Visit date not found  Next physical within 3 mo: Visit date not found      Vijaya Jason, [facility] Triage/Med Refill 4/20/2020    Requested Prescriptions   Pending Prescriptions Disp Refills     irbesartan (AVAPRO) 150 MG tablet [Pharmacy Med Name: IRBESARTAN 150 MG TABLET] 90 tablet 3     Sig: TAKE 1 TABLET BY MOUTH EVERY DAY       Angiotensin Receptor Blocker Protocol Failed - 4/18/2020 12:33 AM        Failed - PCP or prescribing provider visit in past 12 months       Last office visit with prescriber/PCP: 2/13/2019 Jailyn Chawla MD OR same dept: Visit date not found OR same specialty: 3/13/2019 Elena Escalante MD  Last physical: 1/16/2019 Last MTM visit: Visit date not found   Next visit within 3 mo: Visit date not found  Next physical within 3 mo: Visit date not found  Prescriber OR PCP: Jailyn Chawla MD  Last diagnosis associated with med order: 1. HTN (hypertension)  - irbesartan (AVAPRO) 150 MG tablet [Pharmacy Med Name: IRBESARTAN 150 MG TABLET]; TAKE 1 TABLET BY MOUTH EVERY DAY  Dispense: 90 tablet; Refill: 3    If protocol passes may refill for 12 months if within 3 months of last provider visit (or a total of 15 months).             Failed - Serum potassium within the past 12 months     No results found for: LN-POTASSIUM          Failed - Blood pressure filed in past 12 months     BP Readings from Last 1 Encounters:   04/11/19 120/78             Failed - Serum creatinine within the past 12 months     Creatinine   Date Value Ref Range Status   04/11/2019 0.95 0.60 - 1.10 mg/dL Final

## 2021-06-08 NOTE — TELEPHONE ENCOUNTER
RN cannot approve Refill Request    RN can NOT refill this medication PCP messaged that patient is overdue for Labs and Office Visit. Last office visit: 2/13/2019 Jailyn Chawla MD Last Physical: 1/16/2019 Last MTM visit: Visit date not found Last visit same specialty: 3/13/2019 Elena Escalante MD.  Next visit within 3 mo: Visit date not found  Next physical within 3 mo: Visit date not found      Deb De Paz, [facility] Triage/Med Refill 5/31/2020    Requested Prescriptions   Pending Prescriptions Disp Refills     hydroCHLOROthiazide (MICROZIDE) 12.5 mg capsule  0     Sig: Take by mouth daily.       Diuretics/Combination Diuretics Refill Protocol  Failed - 5/28/2020 12:25 PM        Failed - Visit with PCP or prescribing provider visit in past 12 months     Last office visit with prescriber/PCP: 2/13/2019 Jailyn Chawla MD OR same dept: Visit date not found OR same specialty: 3/13/2019 Elena Escalante MD  Last physical: 1/16/2019 Last MTM visit: Visit date not found   Next visit within 3 mo: Visit date not found  Next physical within 3 mo: Visit date not found  Prescriber OR PCP: Jailyn Chawla MD  Last diagnosis associated with med order: 1. Essential hypertension  - hydroCHLOROthiazide (MICROZIDE) 12.5 mg capsule; Take by mouth daily.; Refill: 0    2. HTN (hypertension)    If protocol passes may refill for 12 months if within 3 months of last provider visit (or a total of 15 months).             Failed - Serum Potassium in past 12 months      No results found for: LN-POTASSIUM          Failed - Serum Sodium in past 12 months      No results found for: LN-SODIUM          Failed - Blood pressure on file in past 12 months     BP Readings from Last 1 Encounters:   04/11/19 120/78             Failed - Serum Creatinine in past 12 months      Creatinine   Date Value Ref Range Status   04/11/2019 0.95 0.60 - 1.10 mg/dL Final

## 2021-06-08 NOTE — PROGRESS NOTES
"Lidya Dejesus is a 79 y.o. female who is being evaluated via a billable telephone visit.      The patient has been notified of following:     \"This telephone visit will be conducted via a call between you and your physician/provider. We have found that certain health care needs can be provided without the need for a physical exam.  This service lets us provide the care you need with a short phone conversation.  If a prescription is necessary we can send it directly to your pharmacy.  If lab work is needed we can place an order for that and you can then stop by our lab to have the test done at a later time.    Telephone visits are billed at different rates depending on your insurance coverage. During this emergency period, for some insurers they may be billed the same as an in-person visit.  Please reach out to your insurance provider with any questions.    If during the course of the call the physician/provider feels a telephone visit is not appropriate, you will not be charged for this service.\"    Patient has given verbal consent to a Telephone visit? Yes    What phone number would you like to be contacted at?     Patient would like to receive their AVS by AVS Preference: Dustin.    Assessment/Plan:    1. Benign Essential Hypertension  I encouraged patient to obtain home blood pressure cuff during this time when she is hesitant to come into the clinic.  She should notify us of blood pressure readings.  In the meantime, will provide refills for hydrochlorothiazide and irbesartan at current doses.  Will place future lab orders to check kidney function and electrolytes for when patient feels comfortable enough to come into clinic to have labs drawn.  - hydroCHLOROthiazide (MICROZIDE) 12.5 mg capsule; TAKE 1 CAPSULE BY MOUTH EVERY DAY  Dispense: 90 capsule; Refill: 2  - irbesartan (AVAPRO) 150 MG tablet; Take 1 tablet (150 mg total) by mouth daily.  Dispense: 90 tablet; Refill: 2  - Basic Metabolic Panel; " Future    2. Hypercholesterolemia  Encouraged healthy lifestyle modifications in regards to diet and exercise.      Subjective:    Lidya Dejesus is a 79 year old female here today for a phone visit to seen today for a medication check.  Past medical history significant for hypertension, hyperlipidemia, osteoporosis and history of breast cancer.  It is been over a year since patient has been seen in clinic.  She reports doing well over the last year.  She continues use of hydrochlorothiazide 12.5 mg in addition to irbesartan 150 mg daily for blood pressure management.  Tolerating this regimen well.  She denies having any side effects from the medication.  No chest pain, shortness of breath or other new symptoms that concern her at this time.  She does not take blood pressure regularly outside of the office.  She has tried to stay very isolated during this pandemic and is not leaving the house much he stays.  She is trying to stay active by gardening and walking around her house.  Review of systems is as stated in HPI, and the remainder of the 10 system review is otherwise unremarkable.    Past Medical History, Family History, and Social History reviewed.    Past Surgical History:   Procedure Laterality Date     MASTECTOMY  1990     AR SHARON HOLE EVAC SUBDUR/EXTRA HEMATOMA      Description: [place] Holes For Evacuation Of Subdural Hematoma;  Recorded: 10/23/2008;     AR REMOVAL OF TONSILS,<11 Y/O      Description: Tonsillectomy;  Recorded: 10/23/2008;     AR TOTAL HIP ARTHROPLASTY Right 2/6/2019    Procedure: RIGHT TOTAL HIP ARTHROPLASTY DIRECT ANTERIOR APPROACH;  Surgeon: Luan Gerber MD;  Location: [facility];  Service: Orthopedics        Family History   Problem Relation Age of Onset     COPD Brother      Heart disease Brother      Crohn's disease Daughter      Stroke Neg Hx      Diabetes Neg Hx      Clotting disorder Neg Hx         Past Medical History:   Diagnosis Date     Cancer (H)     breast      Esophageal reflux      Glaucoma      Hypertension      Osteoporosis         Social History     Tobacco Use     Smoking status: Never Smoker     Smokeless tobacco: Never Used   Substance Use Topics     Alcohol use: No     Drug use: No        Current Outpatient Medications   Medication Sig Dispense Refill     bimatoprost (LUMIGAN) 0.01 % Drop Administer 1 drop to both eyes at bedtime.              hydroCHLOROthiazide (MICROZIDE) 12.5 mg capsule TAKE 1 CAPSULE BY MOUTH EVERY DAY 90 capsule 2     irbesartan (AVAPRO) 150 MG tablet Take 1 tablet (150 mg total) by mouth daily. 90 tablet 2     LOTEMAX 0.5 % DrpG STARTING AFTER SURGERY, PLACE 1 DROP IN RIGHT EYE 3 TIMES PER DAY. USE UNTIL GONE.  1     moxifloxacin (VIGAMOX) 0.5 % ophthalmic solution STARTING AFTER SURGERY, PLACE 1 DROP IN RIGHT EYE 3 TIMES PER DAY FOR 2 WEEKS ONLY  1     No current facility-administered medications for this visit.           Objective:    Vitals:    05/29/20 1149   Weight: 150 lb 8 oz (68.3 kg)        This note has been dictated using voice recognition software. Any grammatical or context distortions are unintentional and inherent to the use of this software.     Phone call duration:  9 minutes    Mignon Desouza, CNP

## 2021-06-08 NOTE — PROGRESS NOTES
Subjective:    Lidya Dejesus is seen today for recent illness.  Onset 4 days ago.  Increased fatigue over past 2 days and did stay home from work.  She works at [employer].  States puts on 17,000 steps due to work responsibilities.  Head feels full.  Dry cough.  Symptoms did start to break up last night.  Had flu shot this season.      2 children  Tobacco:  no  EtOH:  occ  Mom -  after hip fracture  Dad -  CVA  4 siblings - sis with dementia; bro passed away  Surgeries:  cerebral blood clot described; tonsils  Hospitalizations:  none  Work:  retired (manager) - now working at [employer] (17,000 steps in a day...)    Past Surgical History:   Procedure Laterality Date     ME SHARON HOLE EVAC SUBDUR/EXTRA HEMATOMA      Description: [place] Holes For Evacuation Of Subdural Hematoma;  Recorded: 10/23/2008;     ME REMOVAL OF TONSILS,<12 Y/O      Description: Tonsillectomy;  Recorded: 10/23/2008;        Family History   Problem Relation Age of Onset     COPD Brother      Crohn's disease Daughter         No past medical history on file.     Social History   Substance Use Topics     Smoking status: Never Smoker     Smokeless tobacco: Never Used     Alcohol use None        Current Outpatient Prescriptions   Medication Sig Dispense Refill     bimatoprost (LUMIGAN) 0.01 % Drop 1 drop bedtime.       hydrochlorothiazide (MICROZIDE) 12.5 mg capsule TAKE 1 CAPSULE BY MOUTH EVERY DAY 90 capsule 3     irbesartan (AVAPRO) 150 MG tablet TAKE 1 TABLET EVERY DAY 90 tablet 3     acetaminophen (TYLENOL ARTHRITIS PAIN) 650 MG CR tablet        brimonidine-timolol (COMBIGAN) 0.2-0.5 % ophthalmic solution 1 drop 2 (two) times a day.       omeprazole (PRILOSEC) 20 MG capsule Take 20 mg by mouth daily.       No current facility-administered medications for this visit.           Objective:    Vitals:    17 0859   BP: 128/60   Pulse: 88   Temp: 98.1  F (36.7  C)   Weight: 159 lb (72.1 kg)      Body mass index is 28.17  kg/(m^2).    Alert.  No apparent distress.  Occasional cough.  HEENT exam conjunctiva clear.  TMs normal.  Nasopharynx with mild congestion only left greater than right.  Oropharynx normal moist expectorants.  Neck supple.  Chest clear.  Cardiac exam regular.  Extremities warm and dry.      Assessment:    1. URI (upper respiratory infection)     2. Benign Essential Hypertension           Plan:    Discussed URI symptoms.  No evidence for bacterial sinusitis, pneumonia, etc.  Patient will notify if worsening symptoms or nonimprovement otherwise declines chest x-ray, CBC, rapid influenza testing etc. today.  Blood pressure at goal.  Will need refills in May 2017.

## 2021-06-08 NOTE — TELEPHONE ENCOUNTER
RN cannot approve Refill Request    RN can NOT refill this medication Protocol failed and NO refill given.      Ave Hilton, [facility] Triage/Med Refill 5/28/2020    Requested Prescriptions   Pending Prescriptions Disp Refills     hydroCHLOROthiazide (MICROZIDE) 12.5 mg capsule [Pharmacy Med Name: HYDROCHLOROTHIAZIDE 12.5 MG CP] 90 capsule 3     Sig: TAKE 1 CAPSULE BY MOUTH EVERY DAY       Diuretics/Combination Diuretics Refill Protocol  Failed - 5/26/2020 12:22 AM        Failed - Visit with PCP or prescribing provider visit in past 12 months     Last office visit with prescriber/PCP: 2/13/2019 Jailyn Chawla MD OR same dept: Visit date not found OR same specialty: 3/13/2019 Elena Escalante MD  Last physical: 1/16/2019 Last MTM visit: Visit date not found   Next visit within 3 mo: Visit date not found  Next physical within 3 mo: Visit date not found  Prescriber OR PCP: Jailyn Chawla MD  Last diagnosis associated with med order: 1. Essential hypertension  - hydroCHLOROthiazide (MICROZIDE) 12.5 mg capsule [Pharmacy Med Name: HYDROCHLOROTHIAZIDE 12.5 MG CP]; TAKE 1 CAPSULE BY MOUTH EVERY DAY  Dispense: 90 capsule; Refill: 0    If protocol passes may refill for 12 months if within 3 months of last provider visit (or a total of 15 months).             Failed - Serum Potassium in past 12 months      No results found for: LN-POTASSIUM          Failed - Serum Sodium in past 12 months      No results found for: LN-SODIUM          Failed - Blood pressure on file in past 12 months     BP Readings from Last 1 Encounters:   04/11/19 120/78             Failed - Serum Creatinine in past 12 months      Creatinine   Date Value Ref Range Status   04/11/2019 0.95 0.60 - 1.10 mg/dL Final

## 2021-06-09 ENCOUNTER — COMMUNICATION - HEALTHEAST (OUTPATIENT)
Dept: FAMILY MEDICINE | Facility: CLINIC | Age: 81
End: 2021-06-09

## 2021-06-09 DIAGNOSIS — R06.09 EXERTIONAL DYSPNEA: ICD-10-CM

## 2021-06-11 NOTE — PROGRESS NOTES
Assessment/Plan:     1. Benign Essential Hypertension  Controlled on current regimen, she will continue.  Will check a comprehensive metabolic panel today and monitoring.  Advised healthy lifestyle habits.  - Comprehensive Metabolic Panel    2. Esophageal reflux  Ranitidine as needed.  Notify with worsening.  Encourage elevation of head of bed and avoidance of large meals late in the day.    3. Hypercholesterolemia  She is not fasting today, advised healthy lifestyle habits and advised that she follow-up fasting for her next visit.  She states she would prefer not to take statin medications, so I think it would be okay if this is next year.    4. Osteoporosis  Encouraged healthy lifestyle habits.  Will check vitamin D level.  Referral made for bone density scan.  - Vitamin D, Total (25-Hydroxy)    5. History of breast cancer  Referral placed for follow-up mammogram.  Will obtain labs as noted.  - HM2(CBC w/o Differential)  - Comprehensive Metabolic Panel    Referral placed for Cologuard.        Subjective:      Lidya Dejesus is a 76 y.o. female presented to clinic today for follow-up of hypertension.  She has been feeling very well and presents only because it has been 2 years since she last had her electrolytes checked.  She is feeling well.  Denies lightheadedness, dizziness, headaches, chest pain, dyspnea, or swelling.  Her reflux has been stable, taking ranitidine on occasion but not regularly, oftentimes it will occur at bedtime when laying down if it is going to occur.  She is a history of breast cancer, due for her mammogram, last had that done several years ago.  She also has a history of osteoporosis, took alendronate for 7 years, due for follow-up bone density scan.  She saw orthopedics both at [place] and [facility] regarding right hip osteoarthritis, considering future total hip arthroplasty but for now her hip pain does not seem to be limiting her other than some mild tightness when she first  stands up.  She remains physically active walking 14,000 steps per work shift, working 35 hours per week, and also knows her lawn which takes 2 and half hours.  She is agreeable to scheduling mammogram.  We review colon cancer screening, she is agreeable to Cologuard.    Current Outpatient Prescriptions   Medication Sig Dispense Refill     bimatoprost (LUMIGAN) 0.01 % Drop 1 drop bedtime.       hydroCHLOROthiazide (MICROZIDE) 12.5 mg capsule TAKE 1 CAPSULE BY MOUTH EVERY DAY 90 capsule 0     ibuprofen (ADVIL,MOTRIN) 200 MG tablet Take 200 mg by mouth as needed for pain.       irbesartan (AVAPRO) 150 MG tablet TAKE 1 TABLET BY MOUTH EVERY DAY 90 tablet 0     omeprazole (PRILOSEC) 20 MG capsule Take 20 mg by mouth daily.       acetaminophen (TYLENOL ARTHRITIS PAIN) 650 MG CR tablet        brimonidine-timolol (COMBIGAN) 0.2-0.5 % ophthalmic solution 1 drop 2 (two) times a day.       No current facility-administered medications for this visit.        Past Medical History, Family History, and Social History reviewed.  No past medical history on file.  Past Surgical History:   Procedure Laterality Date     [facility] SHARON HOLE EVAC SUBDUR/EXTRA HEMATOMA      Description: [facility] Holes For Evacuation Of Subdural Hematoma;  Recorded: 10/23/2008;     [facility] REMOVAL OF TONSILS,<11 Y/O      Description: Tonsillectomy;  Recorded: 10/23/2008;     Ace inhibitors and Combigan [brimonidine-timolol]  Family History   Problem Relation Age of Onset     COPD Brother      Crohn's disease Daughter      Social History     Social History     Marital status: Single     Spouse name: N/A     Number of children: N/A     Years of education: N/A     Occupational History     Not on file.     Social History Main Topics     Smoking status: Never Smoker     Smokeless tobacco: Never Used     Alcohol use Not on file     Drug use: Not on file     Sexual activity: Not on file     Other Topics Concern     Not on file     Social History Narrative         Review of  "systems is as stated in HPI, and the remainder of the 10 system review is otherwise negative.    Objective:     Vitals:    06/14/17 1005   BP: 138/68   Patient Site: Left Arm   Patient Position: Sitting   Cuff Size: Adult Regular   Pulse: 68   Resp: 20   Temp: 97.9  F (36.6  C)   TempSrc: Oral   Weight: 159 lb 6.4 oz (72.3 kg)   Height: 5' 3\" (1.6 m)    Body mass index is 28.24 kg/(m^2).      Alert female.  Mucous membranes moist.  Heart with regular rate and rhythm.  Lungs clear.  Abdomen is soft and nontender.  Remedies without edema.    This note has been dictated using voice recognition software. Any grammatical or context distortions are unintentional and inherent to the the software.     "

## 2021-06-12 NOTE — TELEPHONE ENCOUNTER
"Has been on PB medications for a 'long time\"  It was recommended she check her blood pressure at home/ has been getting readings like 143/70/ feels fine but wants a nurse visit to check the consistency of her matching with the office readings  Sent to scheduling  TAE Isaacs    Reason for Disposition    Systolic BP >= 130 OR Diastolic >= 80, and is taking BP medications    Additional Information    Negative: Sounds like a life-threatening emergency to the triager    Negative: Pregnant > 20 weeks or postpartum (< 6 weeks after delivery) and new hand or face swelling    Negative: Pregnant > 20 weeks and BP > 140/90    Negative: Systolic BP >= 160 OR Diastolic >= 100, and any cardiac or neurologic symptoms (e.g., chest pain, difficulty breathing, unsteady gait, blurred vision)    Negative: Patient sounds very sick or weak to the triager    Negative: BP Systolic BP >= 140 OR Diastolic >= 90 and postpartum (from 0 to 6 weeks after delivery)    Negative: Systolic BP >= 180 OR Diastolic >= 110, and missed most recent dose of blood pressure medication    Negative: Systolic BP >= 180 OR Diastolic >= 110    Negative: Patient wants to be seen    Negative: Ran out of BP medications    Negative: Taking BP medications and feels is having side effects (e.g., impotence, cough, dizziness)    Negative: Systolic BP >= 160 OR Diastolic >= 100    Negative: Systolic BP >= 130 OR Diastolic >= 80, and pregnant    Protocols used: HIGH BLOOD PRESSURE-A-OH      "

## 2021-06-14 NOTE — PROGRESS NOTES
Assessment and Plan:     1. Medicare annual wellness visit, subsequent  At today's visit, we discussed lifestyle interventions to promote self-management and wellness, including maintenance of a healthy weight, healthy diet, regular physical activity and exercise, and falls prevention.  Advised Covid vaccine when it becomes available to her.  Referral placed for follow-up bone density scan.  Discussed breast cancer screening, she is not interested in screening mammograms.    2. Benign Essential Hypertension  Blood pressure today is elevated, this is a significant change for her.  Blood pressures at home have been within reasonable range.  We discussed options.  She will check her home blood pressures, using the cuff that we have previously ensure it is appropriately calibrated, and will update us in 2 weeks.  Will check comprehensive metabolic panel today and monitoring of renal function.  - Comprehensive Metabolic Panel    3. Hypercholesterolemia  Encouraged healthy lifestyle habits.  Will check fasting lipids today.  - Lipid Damon FASTING  - Comprehensive Metabolic Panel    4. Osteoporosis, unspecified osteoporosis type, unspecified pathological fracture presence  Encouraged weightbearing exercises, measures to reduce risk of falls, and adequate calcium and vitamin D intake.  Advised future bone density scan.  Will check vitamin D level today to ensure sufficiency.  - Vitamin D, Total (25-Hydroxy)  - DXA Bone Density Scan; Future    5. Gastroesophageal reflux disease without esophagitis  Adequately managed with intermittent Pepcid.    6. History of breast cancer  No evidence of recurrence.  She declines ongoing mammograms for screening.    7. Glaucoma, unspecified glaucoma type, unspecified laterality  Followed by ophthalmology.    8. Encounter for HCV screening test for low risk patient  Obtain screening hepatitis C antibody today.  - Hepatitis C Antibody (Anti-HCV)    9. Exertional dyspnea  We discussed  broad differential diagnosis.  EKG with subtle new findings, unclear if these are clinically significant.  Will await radiology interpretation of chest x-ray.  We will plan to proceed with nuclear stress test to assess for cardiac etiology.  - Electrocardiogram Perform and Read  - XR Chest 2 Views  - HM2(CBC w/o Differential)  - NM Exercise Stress Test; Future    10. Hip pain, right  Mild with very mild restriction in motion intermittently.  Referral is placed to physical therapy.  - Ambulatory referral to PT/OT     The patient's current medical problems were reviewed.    The following health maintenance schedule was reviewed with the patient and provided in printed form in the after visit summary:   Health Maintenance   Topic Date Due     HEPATITIS C SCREENING  1940     DEXA SCAN  1940     ZOSTER VACCINES (2 of 3) 12/11/2007     LIPID  01/24/2017     MEDICARE ANNUAL WELLNESS VISIT  01/27/2022     FALL RISK ASSESSMENT  01/27/2022     TD 18+ HE  11/06/2023     ADVANCE CARE PLANNING  12/09/2024     Pneumococcal Vaccine: 65+ Years  Completed     INFLUENZA VACCINE RULE BASED  Completed     Pneumococcal Vaccine: Pediatrics (0 to 5 Years) and At-Risk Patients (6 to 64 Years)  Aged Out     HEPATITIS B VACCINES  Aged Out        Subjective:   Chief Complaint: Lidya Dejesus is an 80 y.o. female here for an Annual Wellness visit.   HPI: She had her right hip replaced February 2019, noting that after prolonged sitting she will have some difficulty straightening up and will have a mild discomfort.  This is particularly true after driving.  Wonder if physical therapy may be helpful.    Also noting that in general at work where she is frequently walking quickly or carrying things she will sometimes feel short of breath.  She works at [employer].  Does not notice this at home.  No cough, chest pain, lightheadedness, or edema.  No palpitations.  Wondering if this should be evaluated further or if it is  deconditioning.    History of hypertension, remains compliant with the irbesartan and hydrochlorothiazide.  Blood pressures typically in the 130s to 140s systolic.  History of dyslipidemia, requires no medications for this.  Reflux has been managed with intermittent Pepcid.  Prior history of osteoporosis, was on alendronate for about 7 years but is no longer needing to take it, due for follow-up bone density scan.  History of breast cancer status postmastectomy in 1990.  States that she is not interested in ongoing monitoring.  History of glaucoma, managed by ophthalmology    Review of Systems:   Please see above.  The rest of the review of systems are negative for all systems.    Patient Care Team:  Jailyn Chawla MD as PCP - General  Luan Gerber MD as Physician (Orthopedic Surgery)  Jailyn Chawla MD as Assigned PCP     Patient Active Problem List   Diagnosis     Hypercholesterolemia     Glaucoma     Benign Essential Hypertension     Esophageal Reflux     Osteoporosis     History of breast cancer     Status post total replacement of right hip     Past Medical History:   Diagnosis Date     Cancer (H)     breast     Esophageal reflux      Glaucoma      Hypertension      Osteoporosis       Past Surgical History:   Procedure Laterality Date     MASTECTOMY  1990     [facility] SHARON HOLE EVAC SUBDUR/EXTRA HEMATOMA      Description: [facility] Holes For Evacuation Of Subdural Hematoma;  Recorded: 10/23/2008;     [facility] REMOVAL OF TONSILS,<13 Y/O      Description: Tonsillectomy;  Recorded: 10/23/2008;     AK TOTAL HIP ARTHROPLASTY Right 2/6/2019    Procedure: RIGHT TOTAL HIP ARTHROPLASTY DIRECT ANTERIOR APPROACH;  Surgeon: Luan Gerber MD;  Location[facility];  Service: Orthopedics      Family History   Problem Relation Age of Onset     COPD Brother      Heart disease Brother      Crohn's disease Daughter      Stroke Neg Hx      Diabetes Neg Hx      Clotting disorder Neg Hx       Social History      Socioeconomic History     Marital status: Single     Spouse name: Not on file     Number of children: 2     Years of education: Not on file     Highest education level: Not on file   Occupational History     Occupation: Retail     Employer: [employer]     Comment: part time     Occupation: Retired     Employer: [employer]      Social Needs     Financial resource strain: Not on file     Food insecurity     Worry: Not on file     Inability: Not on file     Transportation needs     Medical: Not on file     Non-medical: Not on file   Tobacco Use     Smoking status: Never Smoker     Smokeless tobacco: Never Used   Substance and Sexual Activity     Alcohol use: No     Drug use: No     Sexual activity: Not on file   Lifestyle     Physical activity     Days per week: Not on file     Minutes per session: Not on file     Stress: Not on file   Relationships     Social connections     Talks on phone: Not on file     Gets together: Not on file     Attends Jain service: Not on file     Active member of club or organization: Not on file     Attends meetings of clubs or organizations: Not on file     Relationship status: Not on file     Intimate partner violence     Fear of current or ex partner: Not on file     Emotionally abused: Not on file     Physically abused: Not on file     Forced sexual activity: Not on file   Other Topics Concern     Not on file   Social History Narrative     Not on file      Current Outpatient Medications   Medication Sig Dispense Refill     bimatoprost (LUMIGAN) 0.01 % Drop Administer 1 drop to both eyes at bedtime.              hydroCHLOROthiazide (MICROZIDE) 12.5 mg capsule TAKE 1 CAPSULE BY MOUTH EVERY DAY 90 capsule 2     irbesartan (AVAPRO) 150 MG tablet Take 1 tablet (150 mg total) by mouth daily. 90 tablet 2     hydroCHLOROthiazide (MICROZIDE) 12.5 mg capsule Take 1 capsule (12.5 mg total) by mouth daily. 90 capsule 0     LOTEMAX 0.5 % DrpG STARTING AFTER SURGERY, PLACE 1  "DROP IN RIGHT EYE 3 TIMES PER DAY. USE UNTIL GONE.  1     moxifloxacin (VIGAMOX) 0.5 % ophthalmic solution STARTING AFTER SURGERY, PLACE 1 DROP IN RIGHT EYE 3 TIMES PER DAY FOR 2 WEEKS ONLY  1     No current facility-administered medications for this visit.       Objective:   Vital Signs:   Visit Vitals  /70   Pulse 76   Temp 97.1  F (36.2  C)   Resp 20   Ht 5' 1\" (1.549 m)   Wt 156 lb (70.8 kg)   SpO2 99%   BMI 29.48 kg/m           VisionScreening:  No exam data present     PHYSICAL EXAM  Physical Examination: General appearance - alert, well appearing, and in no distress, oriented to person, place, and time and normal appearing weight  Mental status - alert, oriented to person, place, and time, normal mood, behavior, speech, dress, motor activity, and thought processes  Eyes - pupils equal and reactive, extraocular eye movements intact  Ears - bilateral TM's and external ear canals normal  Nose - normal and patent, no erythema, discharge or polyps  Mouth - mucous membranes moist, pharynx normal without lesions  Neck - supple, no significant adenopathy  Lymphatics - no palpable lymphadenopathy, no hepatosplenomegaly  Chest - clear to auscultation, no wheezes, rales or rhonchi, symmetric air entry  Heart - normal rate, regular rhythm, normal S1, S2, no murmurs, rubs, clicks or gallops  Abdomen - soft, nontender, nondistended, no masses or organomegaly  Neurological - alert, oriented, normal speech, no focal findings or movement disorder noted  Musculoskeletal - no joint tenderness, deformity or swelling  Extremities - peripheral pulses normal, no pedal edema, no clubbing or cyanosis  Skin - normal coloration and turgor, no rashes, no suspicious skin lesions noted      I ordered and personally reviewed a twelve-lead EKG which reveals normal sinus rhythm, flattened T waves in lead III which is a new change, but otherwise unremarkable from previous EKG.    I ordered and personally reviewed a 2 view chest x-ray.  " Normal cardiac silhouette.  Unremarkable pulmonary vasculature.  No focal infiltrate.  Left upper lung field is with an area of poorly defined opacity, will await radiology interpretation.    Assessment Results 1/27/2021   Activities of Daily Living No help needed   Instrumental Activities of Daily Living No help needed   Mini Cog Total Score 5   Some recent data might be hidden     A Mini-Cog score of 0-2 suggests the possibility of dementia, score of 3-5 suggests no dementia    Identified Health Risks:     She is at risk for lack of exercise and has been provided with information to increase physical activity for the benefit of her well-being.  The patient was counseled and encouraged to consider modifying their diet and eating habits. She was provided with information on recommended healthy diet options.  Patient's advanced directive was discussed and I am comfortable with the patient's wishes.

## 2021-06-15 NOTE — PROGRESS NOTES
Preoperative Exam    Scheduled Procedure: Rt total hip arthroplasty direct anterior approach   Surgery Date:  2-21-18  Surgery Location: [facility], fax [phone]    Surgeon:  Dr Gerber    Assessment/Plan:     1. Preop general physical exam  Surgical risks include controlled hypertension, hyperlipidemia that has not required medication to be treated.  No other significant cautions or contraindications identified.  She may proceed with surgery as scheduled without further clinical clarification or evaluation.  May proceed with choice of anesthesia.  - HM2(CBC w/o Differential)She may proceed with surgery as scheduled without further clinical clarification or evaluation.  May proceed with choice of anesthesia.      2. Primary osteoarthritis of right hip  To proceed with surgical treatment.    3. Benign Essential Hypertension  Controlled on current medication.  I have advised that she hold her hydrochlorothiazide and irbesartan the morning of surgery.  - Electrocardiogram Perform - Clinic  - Basic Metabolic Panel    4. Hypercholesterolemia  Stable, not requiring medical treatment at this time.    5. Esophageal reflux  Stable.    6. Osteoporosis  Stable.    7. Glaucoma  Stable on current treatment.    Surgical Procedure Risk: Intermediate (reported cardiac risk generally 1-5%)  Have you had prior anesthesia?: Yes  Have you or any family members had a previous anesthesia reaction:  No  Do you or any family members have a history of a clotting or bleeding disorder?: No  Cardiac Risk Assessment: no increased risk for major cardiac complications    Patient approved for surgery with general or local anesthesia.    Functional Status: Independant  Patient plans to recover at home with family.     Subjective:      Lidya Dejesus is a 77 y.o. female who presents for a preoperative consultation.  She has a history of right hip osteoarthritis.  It does not cause her pain that has caused significant disruption of  activities of daily living and disruption of walking, now needing to use a cane.  Requiring surgical treatment.    History of hypertension that has been controlled on hydrochlorothiazide and irbesartan.  No history of underlying coronary artery disease.  History of hyperlipidemia, she has not been interested in treatment for this in the past and it is been mild.  She has a history of glaucoma for which he uses an eyedrop daily.  Reflux has been stable.  Remote history of breast cancer status post mastectomy.    All other systems reviewed and are negative, other than those listed in the HPI.    Pertinent History  Do you have difficulty breathing or chest pain after walking up a flight of stairs: No  History of obstructive sleep apnea: No  Steroid use in the last 6 months: No  Frequent Aspirin/NSAID use: Yes: advil as needed for joint pain  Prior Blood Transfusion: No  Prior Blood Transfusion Reaction: No  If for some reason prior to, during or after the procedure, if it is medically indicated, would you be willing to have a blood transfusion?:  There is no transfusion refusal.    Current Outpatient Prescriptions   Medication Sig Dispense Refill     bimatoprost (LUMIGAN) 0.01 % Drop 1 drop bedtime.       hydroCHLOROthiazide (MICROZIDE) 12.5 mg capsule TAKE 1 CAPSULE BY MOUTH EVERY DAY 90 capsule 1     ibuprofen (ADVIL,MOTRIN) 200 MG tablet Take 200 mg by mouth as needed for pain.       irbesartan (AVAPRO) 150 MG tablet TAKE 1 TABLET BY MOUTH EVERY DAY 90 tablet 1     No current facility-administered medications for this visit.         Allergies   Allergen Reactions     Ace Inhibitors Cough     Combigan [Brimonidine-Timolol]        Patient Active Problem List   Diagnosis     Hypercholesterolemia     Glaucoma     Benign Essential Hypertension     Esophageal Reflux     Osteoporosis     Primary osteoarthritis of right hip     History of breast cancer       History reviewed. No pertinent past medical history.    Past  "Surgical History:   Procedure Laterality Date     MASTECTOMY  1990     [place] SHARON HOLE EVAC SUBDUR/EXTRA HEMATOMA      Description: [place] Holes For Evacuation Of Subdural Hematoma;  Recorded: 10/23/2008;     [place] REMOVAL OF TONSILS,<11 Y/O      Description: Tonsillectomy;  Recorded: 10/23/2008;       Social History     Social History     Marital status: Single     Spouse name: N/A     Number of children: 2     Years of education: N/A     Occupational History     [employer]     part time     Retired [employer]        Social History Main Topics     Smoking status: Never Smoker     Smokeless tobacco: Never Used     Alcohol use No     Drug use: No     Sexual activity: Not on file     Other Topics Concern     Not on file     Social History Narrative       Patient Care Team:  Jailyn Chawla MD as PCP - General  Luan Gerber MD as Physician (Orthopedic Surgery)          Objective:     Vitals:    01/24/18 1003   BP: 122/70   Pulse: 70   Resp: 20   Temp: 97.7  F (36.5  C)   TempSrc: Oral   SpO2: 100%   Weight: 162 lb 12.8 oz (73.8 kg)   Height: 5' 3\" (1.6 m)         Physical Exam:General appearance - alert, well appearing, and in no distress, oriented to person, place, and time and normal appearing weight  Mental status - alert, oriented to person, place, and time, normal mood, behavior, speech, dress, motor activity, and thought processes, affect appropriate to mood  Eyes - pupils equal and reactive, extraocular eye movements intact  Ears - bilateral TM's and external ear canals normal  Nose - normal and patent, no erythema, discharge or polyps  Mouth - mucous membranes moist, pharynx normal without lesions  Neck - supple, no significant adenopathy  Lymphatics - no palpable lymphadenopathy, no hepatosplenomegaly  Chest - clear to auscultation, no wheezes, rales or rhonchi, symmetric air entry  Heart - normal rate, regular rhythm, normal S1, S2, no murmurs, rubs, clicks or gallops  Abdomen - soft, " nontender, nondistended, no masses or organomegaly  Neurological - alert, oriented, normal speech, no focal findings or movement disorder noted  Musculoskeletal -antalgic gait, walking with cane, reduced range of motion right hip  Extremities - peripheral pulses normal, no pedal edema, no clubbing or cyanosis  Skin - normal coloration and turgor, no rashes, no suspicious skin lesions noted    Patient Instructions     Hold all supplements, aspirin and NSAIDs for 7 days prior to surgery.  Follow your surgeon's direction on when to stop eating and drinking prior to surgery.  Your surgeon will be managing your pain after your surgery.    Remove all jewelry and metal piercings before your surgery.   Remove nail polish from fingers before surgery.      EKG: I ordered and personally reviewed a 12-lead EKG which reveals normal sinus rhythm, no ischemic changes.    Labs:  Recent Results (from the past 24 hour(s))   HM2(CBC w/o Differential)    Collection Time: 01/24/18 10:03 AM   Result Value Ref Range    WBC 6.1 4.0 - 11.0 thou/uL    RBC 4.49 3.80 - 5.40 mill/uL    Hemoglobin 13.2 12.0 - 16.0 g/dL    Hematocrit 39.2 35.0 - 47.0 %    MCV 87 80 - 100 fL    MCH 29.4 27.0 - 34.0 pg    MCHC 33.6 32.0 - 36.0 g/dL    RDW 13.4 11.0 - 14.5 %    Platelets 290 140 - 440 thou/uL    MPV 7.4 7.0 - 10.0 fL   Electrocardiogram Perform - Clinic    Collection Time: 01/24/18 10:23 AM   Result Value Ref Range    SYSTOLIC BLOOD PRESSURE  mmHg    DIASTOLIC BLOOD PRESSURE  mmHg    VENTRICULAR RATE 62 BPM    ATRIAL RATE 62 BPM    P-R INTERVAL 178 ms    QRS DURATION 84 ms    Q-T INTERVAL 390 ms    QTC CALCULATION (BEZET) 395 ms    P Axis 44 degrees    R AXIS 36 degrees    T AXIS 37 degrees    MUSE DIAGNOSIS       Normal sinus rhythm  Normal ECG  No previous ECGs available         Immunization History   Administered Date(s) Administered     DT (pediatric) 09/22/2000     Hep A, historic 10/16/2007, 10/16/2008     Influenza high dose, seasonal  09/30/2015, 10/03/2016, 10/11/2017     Influenza, inj, historic,unspecified 10/16/2008, 09/25/2014     Influenza, seasonal,quad inj 6-35 mos 09/24/2009, 09/12/2010, 09/26/2011, 10/16/2012, 10/15/2013     Pneumo Conj 13-V (2010&after) 07/22/2015     Pneumo Polysac 23-V 11/16/2006     Td,adult,historic,unspecified 09/22/2000     Tdap 11/06/2013     ZOSTER 10/16/2007           Electronically signed by Jailyn Chawla MD 01/24/18 10:04 AM

## 2021-06-16 PROBLEM — Z96.641 STATUS POST TOTAL REPLACEMENT OF RIGHT HIP: Status: ACTIVE | Noted: 2019-02-13

## 2021-06-16 PROBLEM — Z85.3 HISTORY OF BREAST CANCER: Status: ACTIVE | Noted: 2018-01-24

## 2021-06-16 NOTE — TELEPHONE ENCOUNTER
Refill Approved    Rx renewed per Medication Renewal Policy. Medication was last renewed on 05/29/2020.  Last office visit was 01/27/2021.     Brynn Godoy, [facility] Triage/Med Refill 4/17/2021     Requested Prescriptions   Pending Prescriptions Disp Refills     irbesartan (AVAPRO) 150 MG tablet 90 tablet 2     Sig: Take 1 tablet (150 mg total) by mouth daily.       Angiotensin Receptor Blocker Protocol Passed - 4/16/2021  9:47 AM        Passed - PCP or prescribing provider visit in past 12 months       Last office visit with prescriber/PCP: 2/13/2019 Jailyn Chawla MD OR same dept: Visit date not found OR same specialty: 3/13/2019 Elena Escalante MD  Last physical: 1/27/2021 Last MTM visit: Visit date not found   Next visit within 3 mo: Visit date not found  Next physical within 3 mo: Visit date not found  Prescriber OR PCP: Jailyn Chawla MD  Last diagnosis associated with med order: 1. Benign Essential Hypertension  - irbesartan (AVAPRO) 150 MG tablet; Take 1 tablet (150 mg total) by mouth daily.  Dispense: 90 tablet; Refill: 2    If protocol passes may refill for 12 months if within 3 months of last provider visit (or a total of 15 months).             Passed - Serum potassium within the past 12 months     Lab Results   Component Value Date    Potassium 4.2 01/27/2021             Passed - Blood pressure filed in past 12 months     BP Readings from Last 1 Encounters:   01/27/21 156/70             Passed - Serum creatinine within the past 12 months     Creatinine   Date Value Ref Range Status   01/27/2021 1.11 (H) 0.60 - 1.10 mg/dL Final

## 2021-06-18 NOTE — PATIENT INSTRUCTIONS - HE
Patient Instructions by Jailyn Chawla MD at 1/27/2021 10:10 AM     Author: Jailyn Chawla MD Service: -- Author Type: Physician    Filed: 1/27/2021 11:51 AM Encounter Date: 1/27/2021 Status: Addendum    : Jailyn Chawla MD (Physician)    Related Notes: Original Note by Jailyn Chawla MD (Physician) filed at 1/27/2021 11:50 AM       Continue to check your blood pressures at home.  Send us updated blood pressure results in about 2 weeks.  If your blood pressure remains elevated, we may need to adjust your medications.    Our cardiology office will contact you to schedule the stress test.    Our physical therapy office will contact you to schedule physical therapy for your hip.    Our radiology office will contact you to schedule a bone density scan to follow-up on your osteoporosis.  Patient Education     Exercise for a Healthier Heart  You may wonder how you can improve the health of your heart. If youre thinking about exercise, youre on the right track. You dont need to become an athlete, but you do need a certain amount of brisk exercise to help strengthen your heart. If you have been diagnosed with a heart condition, your doctor may recommend exercise to help stabilize your condition. To help make exercise a habit, choose safe, fun activities.       Be sure to check with your health care provider before starting an exercise program.    Why exercise?  Exercising regularly offers many healthy rewards. It can help you do all of the following:    Improve your blood cholesterol levels to help prevent further heart trouble    Lower your blood pressure to help prevent a stroke or heart attack    Control diabetes, or reduce your risk of getting this disease    Improve your heart and lung function    Reach and maintain a healthy weight    Make your muscles stronger and more limber so you can stay active    Prevent falls and fractures by slowing the loss of bone mass (osteoporosis)    Manage  stress better  Exercise tips  Ease into your routine. Set small goals. Then build on them.  Exercise on most days. Aim for a total of 150 or more minutes of moderate to  vigorous intensity activity each week. Consider 40 minutes, 3 to 4 times a week. For best results, activity should last for 40 minutes on average. It is OK to work up to the 40 minute period over time. Examples of moderate-intensity activity is walking one mile in 15 minutes or 30 to 45 minutes of yard work.  Step up your daily activity level. Along with your exercise program, try being more active throughout the day. Walk instead of drive. Do more household tasks or yard work.  Choose one or more activities you enjoy. Walking is one of the easiest things you can do. You can also try swimming, riding a bike, or taking an exercise class.  Stop exercising and call your doctor if you:    Have chest pain or feel dizzy or lightheaded    Feel burning, tightness, pressure, or heaviness in your chest, neck, shoulders, back, or arms    Have unusual shortness of breath    Have increased joint or muscle pain    Have palpitations or an irregular heartbeat      9416-0884 [employer]. [address] [place] [address]. All rights reserved. This information is not intended as a substitute for professional medical care. Always follow your healthcare professional's instructions.         Patient Education   Understanding USDA MyPlate  The USDA (US Department of Agriculture) has guidelines to help you make healthy food choices. These are called MyPlate. MyPlate shows the food groups that make up healthy meals using the image of a place setting. Before you eat, think about the healthiest choices for what to put onto your plate or into your cup or bowl. To learn more about building a healthy plate, visit www.choosemyplate.gov.       The Food Groups    Fruits: Any fruit or 100% fruit juice counts as part of the Fruit Group. Fruits may be fresh,  canned, frozen, or dried, and may be whole, cut-up, or pureed. Make half your plate fruits and vegetables.    Vegetables: Any vegetable or 100% vegetable juice counts as a member of the Vegetable Group. Vegetables may be fresh, frozen, canned, or dried. They can be served raw or cooked and may be whole, cut-up, or mashed. Make half your plate fruits and vegetables.     Grains: All foods made from grains are part of the Grains Group. These include wheat, rice, oats, cornmeal, and barley such as bread, pasta, oatmeal, cereal, tortillas, and grits. Grains should be no more than a quarter of your plate. At least half of your grains should be whole grains.    Protein: This group includes meat, poultry, seafood, beans and peas, eggs, processed soy products (like tofu), nuts (including nut butters), and seeds. Make protein choices no more than a quarter of your plate. Meat and poultry choices should be lean or low fat.    Dairy: All fluid milk products and foods made from milk that contain calcium, like yogurt and cheese are part of the Dairy Group. (Foods that have little calcium, such as cream, butter, and cream cheese, are not part of the group.) Most dairy choices should be low-fat or fat-free.    Oils: These are fats that are liquid at room temperature. They include canola, corn, olive, soybean, and sunflower oil. Foods that are mainly oil include mayonnaise, certain salad dressings, and soft margarines. You should have only 5 to 7 teaspoons of oils a day. You probably already get this much from the food you eat.  Use Crunched to Help Build Your Meals  The [employer] can help you plan and track your meals and activity. You can look up individual foods to see or compare their nutritional value. You can get guidelines for what and how much you should eat. You can compare your food choices. And you can assess personal physical activities and see ways you can improve. Go to www.choosemyplate.gov/[employer]/.     9542-6704 [employer]. [address] [place] [address], [place] [address]. All rights reserved. This information is not intended as a substitute for professional medical care. Always follow your healthcare professional's instructions.             Advance Directive  Patients advance directive was discussed and I am comfortable with the patients wishes.  Patient Education   Personalized Prevention Plan  You are due for the preventive services outlined below.  Your care team is available to assist you in scheduling these services.  If you have already completed any of these items, please share that information with your care team to update in your medical record.  Health Maintenance   Topic Date Due   ? HEPATITIS C SCREENING  1940   ? DEXA SCAN  1940   ? ZOSTER VACCINES (2 of 3) 12/11/2007   ? LIPID  01/24/2017   ? MEDICARE ANNUAL WELLNESS VISIT  01/27/2022   ? FALL RISK ASSESSMENT  01/27/2022   ? TD 18+ HE  11/06/2023   ? ADVANCE CARE PLANNING  12/09/2024   ? Pneumococcal Vaccine: 65+ Years  Completed   ? INFLUENZA VACCINE RULE BASED  Completed   ? Pneumococcal Vaccine: Pediatrics (0 to 5 Years) and At-Risk Patients (6 to 64 Years)  Aged Out   ? HEPATITIS B VACCINES  Aged Out

## 2021-06-22 NOTE — PROGRESS NOTES
Assessment/Plan:    1. Acute pain of right shoulder  2. Encounter related to worker's compensation claim  Patient here for initial evaluation of right shoulder pain after injury at work on 11/27/18.  Based on mechanism of fall and current symptoms, unlikely to be fracture; no x-ray at this time.  Suspect muscular injury.  Discussed management with: Tylenol and ibuprofen as needed, as well as ice and heat to the area.  Referral to physical therapy placed today.  Patient asks if steroid injection would be indicated, discussed it would not do this at this time but could be considered in the future; if needed would refer to ortho for this.  - Ambulatory referral to PT/OT      Follow up: needs preop appointment after 1/6/19 before 2/6/19; f/u on shoulder around 4 weeks    Elena Escalante MD  [facility]    Subjective:    Patient ID: Lidya Dejesus is a 78 y.o. female is here today for right shoulder pain from work related injury    Right shoulder pain, worker's compensation  -occurred on 11/27/18 at 6:45am, works at [employer], lighting was low (auxillary only), there was a box on the floor and pt was walking with something in her hands so wasn't able to see the box, she started to fall so grabbed onto bar to catch herself but body twisted and caused pain in her right shoulder  -pt continued to work after the injury  -when pt awoke next morning her shoulder was very painful/sore, difficulty performing tasks such as getting hair done  -pain continued over next few days, perhaps some improvement but still continued  -pt has upcoming right hip surgery on 2/6/19 and is worried about having adequate arm/shoulder strength because of this injury ([facility])  -has continued to work after the injury, no specific restrictions though has asked some coworkers to help with lifting  -some dull ache constantly but pain increases with movement  -pain and stiffness worst in the morning  -no numbness/tingling  -no prior  injuries or surgeries on this shoulder  -does have hx osteoporosis, hx on bisphosphonate      Patient Active Problem List   Diagnosis     Hypercholesterolemia     Glaucoma     Benign Essential Hypertension     Esophageal Reflux     Osteoporosis     Primary osteoarthritis of right hip     History of breast cancer     History reviewed. No pertinent past medical history.  Past Surgical History:   Procedure Laterality Date     MASTECTOMY  1990     [place] SHARON HOLE EVAC SUBDUR/EXTRA HEMATOMA      Description: [place] Holes For Evacuation Of Subdural Hematoma;  Recorded: 10/23/2008;     [place] REMOVAL OF TONSILS,<13 Y/O      Description: Tonsillectomy;  Recorded: 10/23/2008;     Current Outpatient Medications on File Prior to Visit   Medication Sig Dispense Refill     bimatoprost (LUMIGAN) 0.01 % Drop 1 drop bedtime.       hydroCHLOROthiazide (MICROZIDE) 12.5 mg capsule TAKE 1 CAPSULE BY MOUTH EVERY DAY 90 capsule 0     ibuprofen (ADVIL,MOTRIN) 200 MG tablet Take 200 mg by mouth as needed for pain.       irbesartan (AVAPRO) 150 MG tablet TAKE 1 TABLET BY MOUTH EVERY DAY 90 tablet 0     No current facility-administered medications on file prior to visit.      Allergies   Allergen Reactions     Ace Inhibitors Cough     Combigan [Brimonidine-Timolol]      Social History     Socioeconomic History     Marital status: Single     Spouse name: Not on file     Number of children: 2     Years of education: Not on file     Highest education level: Not on file   Social Needs     Financial resource strain: Not on file     Food insecurity - worry: Not on file     Food insecurity - inability: Not on file     Transportation needs - medical: Not on file     Transportation needs - non-medical: Not on file   Occupational History     Occupation: Retail     Employer: [employer]     Comment: part time     Occupation: Retired     Employer: [employer]      Tobacco Use     Smoking status: Never Smoker     Smokeless tobacco: Never Used    Substance and Sexual Activity     Alcohol use: No     Drug use: No     Sexual activity: Not on file   Other Topics Concern     Not on file   Social History Narrative     Not on file     Review of systems is as stated in HPI, and the remainder of system review is otherwise negative.    Objective:      /70   Pulse 71   Wt 157 lb 6 oz (71.4 kg)   BMI 27.88 kg/m      General appearance: awake, NAD  HEENT: atraumatic, normocephalic, no scleral icterus or injection  Neck: normal ROM  Right upper extremity: Normal range of motion of right upper extremity though with some dull ache, no point tenderness with palpation of shoulder joint  Skin: no rashes or lesions  Neuro: alert, oriented x3, CNs grossly intact, no focal deficits appreciated, normal tone/strength/ROM/sensation  Psych: normal mood/affect/behavior, answering questions appropriately, linear thought process

## 2021-06-23 NOTE — ANESTHESIA PREPROCEDURE EVALUATION
Anesthesia Evaluation      Patient summary reviewed   No history of anesthetic complications     Airway   Mallampati: II  Neck ROM: full   Pulmonary - negative ROS and normal exam                          Cardiovascular   Exercise tolerance: > or = 4 METS  (+) hypertension well controlled, ,     Rhythm: regular  Rate: normal,         Neuro/Psych - negative ROS     Endo/Other - negative ROS      GI/Hepatic/Renal    (+) GERD (no symptoms now) well controlled,             Dental                         Anesthesia Plan  Planned anesthetic: spinal    ASA 2     Anesthetic plan and risks discussed with: patient    Post-op plan: routine recovery

## 2021-06-23 NOTE — ANESTHESIA PROCEDURE NOTES
Spinal Block    Patient location during procedure: OR  Start time: 2/6/2019 9:27 AM  End time: 2/6/2019 9:33 AM  Reason for block: primary anesthetic    Staffing:  Performing  Anesthesiologist: Sri Cassidy MD    Preanesthetic Checklist  Completed: patient identified, risks, benefits, and alternatives discussed, timeout performed, consent obtained, at patient's request, airway assessed, oxygen available, suction available, emergency drugs available and hand hygiene performed  Spinal Block  Patient position: sitting  Prep: ChloraPrep  Patient monitoring: heart rate, cardiac monitor, continuous pulse ox and blood pressure  Approach: right paramedian  Location: L3-4  Injection technique: single-shot  Needle type: pencil-tip   Needle gauge: 24 G

## 2021-06-23 NOTE — PROGRESS NOTES
Preoperative Exam    Scheduled Procedure: Right total hip, arthroplasty  Surgery Date:  2-6-19  Surgery Location: [facility], fax [phone]    Surgeon:  Dr Gerber    Assessment/Plan:     1. Preop general physical exam  Surgical risks include controlled hypertension uncontrolled hyperlipidemia.  She may proceed with surgery as scheduled without further clinical clarification or evaluation and may proceed with choice of anesthesia.  - Electrocardiogram Perform and Read  - Basic Metabolic Panel  - HM2(CBC w/o Differential)    2. Primary osteoarthritis of right hip  DC'd with right hip replacement.    3. Benign Essential Hypertension  Controlled on combination of irbesartan and hydrochlorothiazide which she will continue before and after surgery.    4. Hypercholesterolemia  Medically managed without need for medication.    5. Gastroesophageal reflux disease without esophagitis  Stable without need for daily medication.    6. Osteoporosis, unspecified osteoporosis type, unspecified pathological fracture presence  No current active treatment.    7. Glaucoma, unspecified glaucoma type, unspecified laterality  She will continue her usual eyedrops.    Surgical Procedure Risk: Low (reported cardiac risk generally < 1%)  Have you had prior anesthesia?: Yes  Have you or any family members had a previous anesthesia reaction:  No  Do you or any family members have a history of a clotting or bleeding disorder?: No  Cardiac Risk Assessment: no increased risk for major cardiac complications    Patient approved for surgery with general or local anesthesia.    Functional Status: Independent  Patient plans to recover at home with family.     Subjective:      Lidya Dejesus is a 78 y.o. female who presents for a preoperative consultation.  Ongoing pain and dysfunction related to right hip osteoarthritis, increasing impact within day-to-day life, requiring surgical treatment.  Known osteoporosis.  Hypertension controlled on  irbesartan and hydrochlorothiazide daily, tolerating well.  History of hyperlipidemia that is not requiring medical treatment.  History of reflux which has been stable without need for daily medication.  She remains on eyedrops and management of glaucoma.  Prior history of breast cancer.    All other systems reviewed and are negative, other than those listed in the HPI.    Pertinent History  Do you have difficulty breathing or chest pain after walking up a flight of stairs: No  History of obstructive sleep apnea: No  Steroid use in the last 6 months: No  Frequent Aspirin/NSAID use: Advil as needed  Prior Blood Transfusion: No  Prior Blood Transfusion Reaction: No  If for some reason prior to, during or after the procedure, if it is medically indicated, would you be willing to have a blood transfusion?:  There is no transfusion refusal.    Current Outpatient Medications   Medication Sig Dispense Refill     bimatoprost (LUMIGAN) 0.01 % Drop 1 drop bedtime.       hydroCHLOROthiazide (MICROZIDE) 12.5 mg capsule TAKE 1 CAPSULE BY MOUTH EVERY DAY 90 capsule 0     ibuprofen (ADVIL,MOTRIN) 200 MG tablet Take 200 mg by mouth as needed for pain.       irbesartan (AVAPRO) 150 MG tablet TAKE 1 TABLET BY MOUTH EVERY DAY 90 tablet 0     No current facility-administered medications for this visit.         Allergies   Allergen Reactions     Ace Inhibitors Cough     Combigan [Brimonidine-Timolol]        Patient Active Problem List   Diagnosis     Hypercholesterolemia     Glaucoma     Benign Essential Hypertension     Esophageal Reflux     Osteoporosis     Primary osteoarthritis of right hip     History of breast cancer       No past medical history on file.    Past Surgical History:   Procedure Laterality Date     MASTECTOMY  1990     [place] SHARON HOLE EVAC SUBDUR/EXTRA HEMATOMA      Description: [place] Holes For Evacuation Of Subdural Hematoma;  Recorded: 10/23/2008;     VT REMOVAL OF TONSILS,<13 Y/O      Description: Tonsillectomy;   "Recorded: 10/23/2008;       Social History     Socioeconomic History     Marital status: Single     Spouse name: Not on file     Number of children: 2     Years of education: Not on file     Highest education level: Not on file   Social Needs     Financial resource strain: Not on file     Food insecurity - worry: Not on file     Food insecurity - inability: Not on file     Transportation needs - medical: Not on file     Transportation needs - non-medical: Not on file   Occupational History     Occupation: Retail     Employer: [employer]     Comment: part time     Occupation: Retired     Employer: [employer]      Tobacco Use     Smoking status: Never Smoker     Smokeless tobacco: Never Used   Substance and Sexual Activity     Alcohol use: No     Drug use: No     Sexual activity: Not on file   Other Topics Concern     Not on file   Social History Narrative     Not on file       Patient Care Team:  Jailyn Chawla MD as PCP - General  Luan Gerber MD as Physician (Orthopedic Surgery)          Objective:     Vitals:    01/16/19 1458   BP: 134/78   Pulse: 70   Resp: 20   Temp: 97.9  F (36.6  C)   SpO2: 99%   Weight: 159 lb (72.1 kg)   Height: 5' 3\" (1.6 m)         Physical Exam:  Physical Examination: General appearance - alert, well appearing, and in no distress, oriented to person, place, and time and normal appearing weight  Mental status - alert, oriented to person, place, and time, normal mood, behavior, speech, dress, motor activity, and thought processes  Eyes - pupils equal and reactive, extraocular eye movements intact  Ears - bilateral TM's and external ear canals normal  Nose - normal and patent, no erythema, discharge or polyps  Mouth - mucous membranes moist, pharynx normal without lesions  Neck - supple, no significant adenopathy  Lymphatics - no palpable lymphadenopathy, no hepatosplenomegaly  Chest - clear to auscultation, no wheezes, rales or rhonchi, symmetric air entry  Heart - " normal rate, regular rhythm, normal S1, S2, no murmurs, rubs, clicks or gallops  Abdomen - soft, nontender, nondistended, no masses or organomegaly  Neurological - alert, oriented, normal speech, no focal findings or movement disorder noted  Musculoskeletal - no joint tenderness, deformity or swelling  Extremities - peripheral pulses normal, no pedal edema, no clubbing or cyanosis  Skin - normal coloration and turgor, no rashes, no suspicious skin lesions noted      There are no Patient Instructions on file for this visit.    EKG: I ordered and personally reviewed a 12-lead EKG which reveals normal sinus rhythm, no ischemic or arrhythmic changes, unchanged from previous EKG January 2018    Labs:  Recent Results (from the past 24 hour(s))   HM2(CBC w/o Differential)    Collection Time: 01/16/19  2:24 PM   Result Value Ref Range    WBC 7.4 4.0 - 11.0 thou/uL    RBC 4.37 3.80 - 5.40 mill/uL    Hemoglobin 12.7 12.0 - 16.0 g/dL    Hematocrit 38.5 35.0 - 47.0 %    MCV 88 80 - 100 fL    MCH 29.0 27.0 - 34.0 pg    MCHC 33.0 32.0 - 36.0 g/dL    RDW 12.5 11.0 - 14.5 %    Platelets 294 140 - 440 thou/uL    MPV 7.2 7.0 - 10.0 fL   Electrocardiogram Perform and Read    Collection Time: 01/16/19  3:09 PM   Result Value Ref Range    SYSTOLIC BLOOD PRESSURE  mmHg    DIASTOLIC BLOOD PRESSURE  mmHg    VENTRICULAR RATE 66 BPM    ATRIAL RATE 66 BPM    P-R INTERVAL 152 ms    QRS DURATION 92 ms    Q-T INTERVAL 396 ms    QTC CALCULATION (BEZET) 415 ms    P Axis 50 degrees    R AXIS 59 degrees    T AXIS 45 degrees    MUSE DIAGNOSIS       Normal sinus rhythm  Nonspecific ST abnormality  Abnormal ECG  When compared with ECG of 24-JAN-2018 10:23,  No significant change was found         Immunization History   Administered Date(s) Administered     DT (pediatric) 09/22/2000     Hep A, historic 10/16/2007, 10/16/2008     Influenza high dose, seasonal 09/30/2015, 10/03/2016, 10/11/2017, 09/18/2018     Influenza, inj, historic,unspecified  10/16/2008, 09/25/2014     Influenza, seasonal,quad inj 6-35 mos 09/24/2009, 09/12/2010, 09/26/2011, 10/16/2012, 10/15/2013     Pneumo Conj 13-V (2010&after) 07/22/2015     Pneumo Polysac 23-V 11/16/2006     Td,adult,historic,unspecified 09/22/2000     Tdap 11/06/2013     ZOSTER, LIVE 10/16/2007           Electronically signed by Jailyn Chawla MD 01/16/19 2:54 PM

## 2021-06-23 NOTE — TELEPHONE ENCOUNTER
----- Message from Cari Fletcher CMA sent at 1/21/2019  8:15 AM CST -----      ----- Message -----  From: Jailyn Chawla MD  Sent: 1/20/2019   8:33 PM  To: Jailyn Chawla Care Team Pool    Normal kidney function.  Your potassium level is slightly low, likely due to your HCTZ medication.  This wouldn't normally be of significant concern, however it would be best to have this in the normal range at the time of your surgery.  Increase your intake of fresh fruits and veggies, orange juice, and salt substitute, and let's recheck this in one week.  If it is not normal, we will have time to begin a potassium supplement before your surgery.

## 2021-06-23 NOTE — ANESTHESIA CARE TRANSFER NOTE
Last vitals:   Vitals:    02/06/19 1035   BP: 113/56   Pulse: (!) 55   Resp: 16   Temp: 36.9  C (98.5  F)   SpO2: 99%     Patient's level of consciousness is drowsy  Spontaneous respirations: yes  Maintains airway independently: yes  Dentition unchanged: yes  Oropharynx: oropharynx clear of all foreign objects    QCDR Measures:  ASA# 20 - Surgical Safety Checklist: WHO surgical safety checklist completed prior to induction    PQRS# 430 - Adult PONV Prevention: 4558F - Pt received => 2 anti-emetic agents (different classes) preop & intraop  ASA# 8 - Peds PONV Prevention: NA - Not pediatric patient, not GA or 2 or more risk factors NOT present  PQRS# 424 - Amelia-op Temp Management: 4559F - At least one body temp DOCUMENTED => 35.5C or 95.9F within required timeframe  PQRS# 426 - PACU Transfer Protocol: - Transfer of care checklist used  ASA# 14 - Acute Post-op Pain: ASA14B - Patient did NOT experience pain >= 7 out of 10

## 2021-06-23 NOTE — PROGRESS NOTES
[facility]   Right Shoulder Initial Evaluation  Patient Name: Lidya Dejesus  Date of evaluation: 1/16/2019  Today's Date: 1/16/2019  Visit Number: 1 of 12 through 4-10-19   Referring provider: Elena Escalante MD  Referral Diagnosis:   Acute pain of right shoulder [M25.511]  - Primary       Encounter related to worker's compensation claim [Z02.6]       Visit Diagnosis:     ICD-10-CM    1. Acute pain of right shoulder M25.511    2. Encounter related to worker's compensation claim [email or web address]        Assessment:        Lidya is a 78 y.o. female who presents to physical therapy today with complaints of right shoulder pain that began Nov 2019 after tripping over a box and falling at work. She states the pain is pretty constant, though it's mild. She is concerned that she won't be able to use a walker in 2 weeks once she has her right hip replacement surgery, so she comes to PT to make sure she is strong enough to use the walker. She feels it should have been better than it is by now. Clinical exam today reveals full strength in right shoulder, full ROM with some pain in right shoulder, does have some impingement signs, possible rotator cuff tendonitis. We discussed today that she will work on a few gentle stretches and then return to PT following her hip surgery since she states she may not have time to return prior to surgery. Patient would benefit from further PT in order to improve function.     Patient's expectations/goals are: Realistic  Barriers to Learning or Achieving Goals: none    Goals:  Pt. will demonstrate/verbalize independence in self-management of condition in : 6 weeks  Pt. will be independent with home exercise program in : 6 weeks    Pt will: rollover/move covers in bed without increased right shoulder pain for improved sleep hygeine in 6 weeks  Pt will: report waking pain 2/10 or less in right shoulder in 6 weeks         Plan:        Plan for next visit: continue with stretching/strengthening  of right shoulder.   Manual therapy as needed.    Plan of Care:   Authorization / Certification Start Date: 01/16/19  Authorization / Certification End Date: 04/10/19  Authorization / Certification Number of Visits: 12  Communication with: Referral Source  Patient Related Instruction: Nature of Condition;Treatment plan and rationale;Self Care instruction;Basis of treatment;Body mechanics;Posture  Times per Week: 1-2  Number of Weeks: 12  Number of Visits: 12  Discharge Planning: to self care/HEP  Precautions / Restrictions : none  Therapeutic Exercise: ROM;Stretching;Strengthening  Neuromuscular Reeducation: posture;kinesio tape;core  Manual Therapy: soft tissue mobilization;myofascial release;joint mobilization;muscle energy  Modalities: electrical stimulation  Equipment: TENS unit    Patient is in agreement with PT plan of care and goals.        Subjective:         History Of Present Illness:  Lidya is a 78 y.o. female who presents to physical therapy today with complaints of right shoulder pain that began Approx nov 27th after tripping and falling while at work. She fell as she tripped over a box at work. Landed on her right side as she was grabbing for something to try to keep her from falling. Pt reports symptoms are better with changing positions and worse with sleeping, moving covers over her while in bed. Reports the right shoulder pain is the same all the time no matter what she does.   Getting right CHASIDY in 2 weeks, is concerned that she won't be able to use a walker d/t her shoulder pain. Better with laying down on left side.    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                   Work Status: full time  Has split entry home.    Onset/Duration: Nov 2018   Location/Laterality: right shoulder  Timing of symptoms: worse in the morning when she wakes up. As she moves a round, it's a little better, but still dull ache.     Severity:  Pain  Rating Today: 4  Pain rating at best: 2  Pain rating at worst: 4  Quality/Description: dullness       Objective:      Note: Items left blank indicates the item was not performed or not indicated at the time of the evaluation.    Shoulder Examination  1. Acute pain of right shoulder     2. Encounter related to worker's compensation claim       Precautions/Restrictions: None  Involved side: Right  Posture Observation:      General sitting posture is  normal.  Cervical Clearing: Normal    UE ROM    Shoulder flexion: WNL, mild pain  Shoulder abduction: WNL, mild pain  Shoulder ER: reaching behind neck is non painful  Shoulder IR: reaching behind back is sore on right, not on left  Elbow flexion: WNL  Elbow extension: WNL  Wrist extension: WNL  Wrist flexion: WNL    UE Strength   Shoulder flexion: 5/5 bilat  Shoulder abduction: 5/5 bilat  Shoulder ER: 5/5 bilat, mild sore  Shoulder IR: 5.5 bilat  Elbow Ext: 5/5 bilat  Elbow Flexion: 5/5 bilat  Wrist Flexion: 5/5 bilat  Wrist Ext: 5/5 bilat    Shoulder Special Tests  Janay: + R  Painful Arc: -  Cross body Adduction: + R  Sulcus Sign: -  Drop arm sign: -  Speed's: NT    Flexibility:  Decrease scap mobility    Palpation: not tender to right shoulder tendons upon palpation    Patient Outcome Measures:    Shoulder Pain and Disability Index (SPADI) in %: 20     Scores range from 0-100%, where a score of 0% represents minimal pain and maximal function. The minimal clincically important difference is a score reduction of 10%.    Treatment Today    TREATMENT MINUTES COMMENTS   Evaluation 25 Right shoulder   Self-care/ Home management     Manual therapy     Neuromuscular Re-education     Therapeutic Activity     Therapeutic Exercises 25 Ed on stretches to start. Discussed plan to continue with PT following her CHASIDY. Practiced walking with a walker and discussed height.    Gait training     Modality__________________                Total 50    Blank areas are intentional  and mean the treatment did not include these items.     PT Evaluation Code: (Please list factors)  Patient History/Comorbidities:   Patient Active Problem List   Diagnosis     Hypercholesterolemia     Glaucoma     Benign Essential Hypertension     Esophageal Reflux     Osteoporosis     Primary osteoarthritis of right hip     History of breast cancer   Examination: right shoulder pain  Clinical Presentation: stable  Clinical Decision Making: low    Patient History/  Comorbidities Examination  (body structures and functions, activity limitations, and/or participation restrictions) Clinical Presentation Clinical Decision Making (Complexity)   No documented Comorbidities or personal factors 1-2 Elements Stable and/or uncomplicated Low   1-2 documented comorbidities or personal factor 3 Elements Evolving clinical presentation with changing characteristics Moderate   3-4 documented comorbidities or personal factors 4 or more Unstable and unpredictable High                Carolee Watt, FREDDIET, CLT  1/16/2019  10:00 AM

## 2021-06-23 NOTE — ANESTHESIA POSTPROCEDURE EVALUATION
Patient: Lidya Dejesus  RIGHT TOTAL HIP ARTHROPLASTY DIRECT ANTERIOR APPROACH  Anesthesia type: spinal    Patient location: PACU  Last vitals:   Vitals:    02/06/19 1050   BP: 110/51   Pulse: (!) 55   Resp: 13   Temp:    SpO2: 100%     Post vital signs: stable  Level of consciousness: awake and responds to simple questions  Post-anesthesia pain: pain controlled  Post-anesthesia nausea and vomiting: no  Pulmonary: unassisted, return to baseline  Cardiovascular: stable and blood pressure at baseline  Hydration: adequate  Anesthetic events: no    QCDR Measures:  ASA# 11 - Amelia-op Cardiac Arrest: ASA11B - Patient did NOT experience unanticipated cardiac arrest  ASA# 12 - Amelia-op Mortality Rate: ASA12B - Patient did NOT die  ASA# 13 - PACU Re-Intubation Rate: NA - No ETT / LMA used for case  ASA# 10 - Composite Anes Safety: ASA10A - No serious adverse event    Additional Notes:

## 2021-06-24 ENCOUNTER — COMMUNICATION - HEALTHEAST (OUTPATIENT)
Dept: FAMILY MEDICINE | Facility: CLINIC | Age: 81
End: 2021-06-24

## 2021-06-24 DIAGNOSIS — I10 ESSENTIAL HYPERTENSION, BENIGN: ICD-10-CM

## 2021-06-24 NOTE — PROGRESS NOTES
Assessment/Plan:     1. Primary osteoarthritis of right hip  2. Status post total replacement of right hip  Doing quite well in the healing process.  Encouraged her in her efforts.  Discussed aspirin for a total of 6 weeks.  I defer other questions to her surgery team.      There are no Patient Instructions on file for this visit.     No Follow-up on file.      Subjective:      Lidya Dejesus is a 78 y.o. female presented to clinic today for follow-up of hospitalization due to right hip osteoarthritis and total hip replacement on 2/6/2019.  She is been doing quite well post surgery.  Using Tylenol alone for pain, last took oxycodone 1-2 days ago.  Bowels are moving normally.  Appetite stable.  Noted to have a hemoglobin of 10.5 during admission, denies any lightheadedness, dizziness, chest pain, or shortness of breath.  Feeling a little bit more fatigued than prior to hospitalization but overall feeling well.  Pain controlled.  Remains on aspirin 325 mg daily.  She has questions about ability to drive.    Plate medication reconciliation is performed at today's visit.    Current Outpatient Medications   Medication Sig Dispense Refill     acetaminophen (TYLENOL) 500 MG tablet Take 2 tablets (1,000 mg total) by mouth 3 (three) times a day.  0     aspirin 325 MG EC tablet Take 1 tablet (325 mg total) by mouth daily. 42 tablet 0     bimatoprost (LUMIGAN) 0.01 % Drop Administer 1 drop to both eyes at bedtime.              hydroCHLOROthiazide (MICROZIDE) 12.5 mg capsule TAKE 1 CAPSULE BY MOUTH EVERY DAY 90 capsule 0     irbesartan (AVAPRO) 150 MG tablet TAKE 1 TABLET BY MOUTH EVERY DAY 90 tablet 0     No current facility-administered medications for this visit.        Past Medical History, Family History, and Social History reviewed.  Past Medical History:   Diagnosis Date     Cancer (H)     breast     Esophageal reflux      Glaucoma      Hypertension      Osteoporosis      Past Surgical History:   Procedure Laterality  Date     MASTECTOMY  1990     [place] SHARON HOLE EVAC SUBDUR/EXTRA HEMATOMA      Description: [place] Holes For Evacuation Of Subdural Hematoma;  Recorded: 10/23/2008;     NM REMOVAL OF TONSILS,<13 Y/O      Description: Tonsillectomy;  Recorded: 10/23/2008;     NM TOTAL HIP ARTHROPLASTY Right 2/6/2019    Procedure: RIGHT TOTAL HIP ARTHROPLASTY DIRECT ANTERIOR APPROACH;  Surgeon: Luan Gerber MD;  Location: [facility] Main OR;  Service: Orthopedics     Ace inhibitors and Combigan [brimonidine-timolol]  Family History   Problem Relation Age of Onset     COPD Brother      Heart disease Brother      Crohn's disease Daughter      Stroke Neg Hx      Diabetes Neg Hx      Clotting disorder Neg Hx      Social History     Socioeconomic History     Marital status: Single     Spouse name: Not on file     Number of children: 2     Years of education: Not on file     Highest education level: Not on file   Social Needs     Financial resource strain: Not on file     Food insecurity - worry: Not on file     Food insecurity - inability: Not on file     Transportation needs - medical: Not on file     Transportation needs - non-medical: Not on file   Occupational History     Occupation: Retail     Employer: [employer]     Comment: part time     Occupation: Retired     Employer: [employer]      Tobacco Use     Smoking status: Never Smoker     Smokeless tobacco: Never Used   Substance and Sexual Activity     Alcohol use: No     Drug use: No     Sexual activity: Not on file   Other Topics Concern     Not on file   Social History Narrative     Not on file         Review of systems is as stated in HPI, and the remainder of the 10 system review is otherwise negative.    Objective:     Vitals:    02/13/19 1511   BP: 122/74   Pulse: 72   Resp: 16   Temp: 98.1  F (36.7  C)   TempSrc: Oral   SpO2: 98%   Weight: 157 lb (71.2 kg)    Body mass index is 27.81 kg/m .    Alert female.  Ambulates well with walker.  Heart with regular rate and  rhythm.  Lungs clear.  No edema, MELINDA stockings in place.      This note has been dictated using voice recognition software. Any grammatical or context distortions are unintentional and inherent to the the software.

## 2021-06-24 NOTE — PATIENT INSTRUCTIONS - HE
Ok to use Advil/Alleve as needed for pain control  Reasonable to restart physical therapy exercises - goal is to avoid stiffness    Referral to orthopedic surgeon for the shoulder - shoulder specialist  You should received a phone call to schedule this

## 2021-06-24 NOTE — PROGRESS NOTES
TCM DISCHARGE FOLLOW UP CALL    Discharge Date:  2/8/2019  Reason for hospital stay (discharge diagnosis)::  (R) CHASIDY  Are you feeling better, the same or worse since your discharge?:  Patient is feeling better (Walking with walker. Incision covered with surgical dsg.)  Do you feel like you have a plan in the event of a health emergency?: Yes (She talks to her children or makes appt to see PCP)    As part of your discharge plan, were  home care services ordered for you?: No    Did you receive any new medications, or was there a change to your medications?: Yes    Are you taking those medications, or do you have any established regiment?:  Tylenol 1000 mg two times a day for pain. Not needing oxycodone. No constipation, doesn't need Senna or Miralax. Is taking ASA daily  Do you have any follow up visits scheduled with your PCP or Specialist?:  Yes, with PCP (2/13 Dr Chawla. Needs Ortho appt next week for staple removal. Pt will call)  (RN) Is PCP appt scheduled soon enough (within 14 days of discharge date)?: Yes

## 2021-06-24 NOTE — PROGRESS NOTES
Assessment/Plan:    1. Acute pain of right shoulder  2. Encounter related to worker's compensation claim  3. Fall, subsequent encounter  Patient with persistent right shoulder pain after injury that occurred at work on 12/20/18.  I referred her to physical therapy after her last visit, she reports completing this and feeling like things were improving but then had acutely worsening pain after her hip replacement surgery.  Patient is very worried about possible fracture or other abnormality causing pain and is interested in referral to specialist.  Discussed unlikely that patient has fracture based on exam; so possibility for rotator cuff pathology.  Reasonable to refer to orthopedic surgery for consultation at this time as opposed to ordering more expensive imaging right away such as MRI.  - Ambulatory referral to Orthopedic Surgery      Follow up: As needed    Elena Escalante MD  [facility]    Subjective:    Patient ID: Lidya Dejesus is a 78 y.o. female is here today for right shoulder pain    Right shoulder pain  -Patient was initially seen on 12/20/2018 for right shoulder injury at work with resulting pain, Worker's Compensation claim  -had hip replaced since our last visit  -pt did PT - worked on hip and shoulder  -when came out of surgery had acutely worsened right shoulder pain - not sure if related to lying down for the surgery or any manipulation that could have happened  -still with persistent pain since then  -stopped PT exercises now  -still having stiffness of shoulder - worst in the morning  -will take occasional tylenol but nothing scheduled/regular  -no other injuries before the one on 12/20/18  -pain worsens with activity - pulling up trousers, sleeping at night and rolling with arm forward      Patient Active Problem List   Diagnosis     Hypercholesterolemia     Glaucoma     Benign Essential Hypertension     Esophageal Reflux     Osteoporosis     Primary osteoarthritis of right hip      History of breast cancer     OA (osteoarthritis) of hip     Status post total replacement of right hip     Past Medical History:   Diagnosis Date     Cancer (H)     breast     Esophageal reflux      Glaucoma      Hypertension      Osteoporosis      Past Surgical History:   Procedure Laterality Date     MASTECTOMY  1990     LA SHARON HOLE EVAC SUBDUR/EXTRA HEMATOMA      Description: [place] Holes For Evacuation Of Subdural Hematoma;  Recorded: 10/23/2008;     LA REMOVAL OF TONSILS,<13 Y/O      Description: Tonsillectomy;  Recorded: 10/23/2008;     LA TOTAL HIP ARTHROPLASTY Right 2/6/2019    Procedure: RIGHT TOTAL HIP ARTHROPLASTY DIRECT ANTERIOR APPROACH;  Surgeon: Luan Gerber MD;  Location: [facility];  Service: Orthopedics     Current Outpatient Medications on File Prior to Visit   Medication Sig Dispense Refill     acetaminophen (TYLENOL) 500 MG tablet Take 2 tablets (1,000 mg total) by mouth 3 (three) times a day.  0     bimatoprost (LUMIGAN) 0.01 % Drop Administer 1 drop to both eyes at bedtime.              hydroCHLOROthiazide (MICROZIDE) 12.5 mg capsule TAKE 1 CAPSULE BY MOUTH EVERY DAY 90 capsule 3     irbesartan (AVAPRO) 150 MG tablet TAKE 1 TABLET BY MOUTH EVERY DAY 90 tablet 3     [DISCONTINUED] aspirin 325 MG EC tablet Take 1 tablet (325 mg total) by mouth daily. 42 tablet 0     No current facility-administered medications on file prior to visit.      Allergies   Allergen Reactions     Ace Inhibitors Cough     Combigan [Brimonidine-Timolol] Other (See Comments)     Reddened eyes     Social History     Socioeconomic History     Marital status: Single     Spouse name: Not on file     Number of children: 2     Years of education: Not on file     Highest education level: Not on file   Occupational History     Occupation: Retail     Employer: [employer]     Comment: part time     Occupation: Retired     Employer: [employer]      Social Needs     Financial resource strain: Not on file  "    Food insecurity:     Worry: Not on file     Inability: Not on file     Transportation needs:     Medical: Not on file     Non-medical: Not on file   Tobacco Use     Smoking status: Never Smoker     Smokeless tobacco: Never Used   Substance and Sexual Activity     Alcohol use: No     Drug use: No     Sexual activity: Not on file   Lifestyle     Physical activity:     Days per week: Not on file     Minutes per session: Not on file     Stress: Not on file   Relationships     Social connections:     Talks on phone: Not on file     Gets together: Not on file     Attends Quaker service: Not on file     Active member of club or organization: Not on file     Attends meetings of clubs or organizations: Not on file     Relationship status: Not on file     Intimate partner violence:     Fear of current or ex partner: Not on file     Emotionally abused: Not on file     Physically abused: Not on file     Forced sexual activity: Not on file   Other Topics Concern     Not on file   Social History Narrative     Not on file     Review of systems is as stated in HPI, and the remainder of system review is otherwise negative.    Objective:      /70   Pulse 70   Temp 98.2  F (36.8  C) (Tympanic)   Resp 20   Ht 5' 3\" (1.6 m)   Wt 160 lb (72.6 kg)   SpO2 99%   BMI 28.34 kg/m      General appearance: awake, NAD  HEENT: atraumatic, normocephalic, no scleral icterus or injection, ears and nose grossly normal, moist mucous membranes  Lungs: breathing comfortably on room air  Right upper extremity: shoulder ROM mildly limited 2/2 pain (worst is forward flexion and abduction), some point tenderness on upper outer shoulder, negative empty can sign, mild pain with Hawkin's sign, no bruising or malalignment noted  Skin: no rashes or lesions  Neuro: alert, oriented x3, CNs grossly intact, no focal deficits appreciated, normal tone/strength/ROM/sensation, gait normal  Psych: normal mood/affect/behavior, answering questions " appropriately, linear thought process

## 2021-06-24 NOTE — TELEPHONE ENCOUNTER
Refill Approved    Rx renewed per Medication Renewal Policy. Medication was last renewed on 11/21/18.    [facility], [facility] Triage/Med Refill 2/19/2019     Requested Prescriptions   Pending Prescriptions Disp Refills     irbesartan (AVAPRO) 150 MG tablet [Pharmacy Med Name: IRBESARTAN 150 MG TABLET] 90 tablet 0     Sig: TAKE 1 TABLET BY MOUTH EVERY DAY    Angiotensin Receptor Blocker Protocol Passed - 2/16/2019 12:27 AM       Passed - PCP or prescribing provider visit in past 12 months      Last office visit with prescriber/PCP: 2/13/2019 Jailyn Chawla MD OR same dept: 2/13/2019 Jailyn Chawla MD OR same specialty: 2/13/2019 Jailyn Chawla MD  Last physical: 1/16/2019 Last MTM visit: Visit date not found   Next visit within 3 mo: Visit date not found  Next physical within 3 mo: Visit date not found  Prescriber OR PCP: Jailyn Chawla MD  Last diagnosis associated with med order: 1. HTN (hypertension)  - irbesartan (AVAPRO) 150 MG tablet [Pharmacy Med Name: IRBESARTAN 150 MG TABLET]; TAKE 1 TABLET BY MOUTH EVERY DAY  Dispense: 90 tablet; Refill: 0    2. Essential hypertension  - hydroCHLOROthiazide (MICROZIDE) 12.5 mg capsule [Pharmacy Med Name: HYDROCHLOROTHIAZIDE 12.5 MG CP]; TAKE 1 CAPSULE BY MOUTH EVERY DAY  Dispense: 90 capsule; Refill: 0    If protocol passes may refill for 12 months if within 3 months of last provider visit (or a total of 15 months).            Passed - Serum potassium within the past 12 months    Lab Results   Component Value Date    Potassium 3.7 02/07/2019            Passed - Blood pressure filed in past 12 months    BP Readings from Last 1 Encounters:   02/13/19 122/74            Passed - Serum creatinine within the past 12 months    Creatinine   Date Value Ref Range Status   02/07/2019 0.92 0.60 - 1.10 mg/dL Final             hydroCHLOROthiazide (MICROZIDE) 12.5 mg capsule [Pharmacy Med Name: HYDROCHLOROTHIAZIDE 12.5 MG CP] 90 capsule 0     Sig: TAKE 1  CAPSULE BY MOUTH EVERY DAY    Diuretics/Combination Diuretics Refill Protocol  Passed - 2/16/2019 12:27 AM       Passed - Visit with PCP or prescribing provider visit in past 12 months    Last office visit with prescriber/PCP: 2/13/2019 Jailyn Chawla MD OR same dept: 2/13/2019 Jailyn Chawla MD OR same specialty: 2/13/2019 Jailyn Chawla MD  Last physical: 1/16/2019 Last MTM visit: Visit date not found   Next visit within 3 mo: Visit date not found  Next physical within 3 mo: Visit date not found  Prescriber OR PCP: Jailyn Chawla MD  Last diagnosis associated with med order: 1. HTN (hypertension)  - irbesartan (AVAPRO) 150 MG tablet [Pharmacy Med Name: IRBESARTAN 150 MG TABLET]; TAKE 1 TABLET BY MOUTH EVERY DAY  Dispense: 90 tablet; Refill: 0    2. Essential hypertension  - hydroCHLOROthiazide (MICROZIDE) 12.5 mg capsule [Pharmacy Med Name: HYDROCHLOROTHIAZIDE 12.5 MG CP]; TAKE 1 CAPSULE BY MOUTH EVERY DAY  Dispense: 90 capsule; Refill: 0    If protocol passes may refill for 12 months if within 3 months of last provider visit (or a total of 15 months).            Passed - Serum Potassium in past 12 months     Lab Results   Component Value Date    Potassium 3.7 02/07/2019            Passed - Serum Sodium in past 12 months     Lab Results   Component Value Date    Sodium 142 01/16/2019            Passed - Blood pressure on file in past 12 months    BP Readings from Last 1 Encounters:   02/13/19 122/74            Passed - Serum Creatinine in past 12 months     Creatinine   Date Value Ref Range Status   02/07/2019 0.92 0.60 - 1.10 mg/dL Final

## 2021-06-25 RX ORDER — HYDROCHLOROTHIAZIDE 12.5 MG/1
CAPSULE ORAL
Qty: 90 CAPSULE | Refills: 2 | Status: SHIPPED | OUTPATIENT
Start: 2021-06-25 | End: 2022-11-10

## 2021-06-26 ENCOUNTER — HEALTH MAINTENANCE LETTER (OUTPATIENT)
Age: 81
End: 2021-06-26

## 2021-06-26 NOTE — TELEPHONE ENCOUNTER
RN cannot approve Refill Request    RN can NOT refill this medication A break in medication. Last office visit: 2/13/2019 Jailyn Chawla MD Last Physical: 1/27/2021 Last MTM visit: Visit date not found Last visit same specialty: 3/13/2019 Elena Escalante MD.  Next visit within 3 mo: Visit date not found  Next physical within 3 mo: Visit date not found      [facility] Triage/Med Refill 6/24/2021    Requested Prescriptions   Pending Prescriptions Disp Refills     hydroCHLOROthiazide (MICROZIDE) 12.5 mg capsule 90 capsule 2     Sig: TAKE 1 CAPSULE BY MOUTH EVERY DAY       Diuretics/Combination Diuretics Refill Protocol  Passed - 6/23/2021  1:32 PM        Passed - Visit with PCP or prescribing provider visit in past 12 months     Last office visit with prescriber/PCP: 2/13/2019 Jailyn Chawla MD OR same dept: Visit date not found OR same specialty: 3/13/2019 Elena Escalante MD  Last physical: 1/27/2021 Last MTM visit: Visit date not found   Next visit within 3 mo: Visit date not found  Next physical within 3 mo: Visit date not found  Prescriber OR PCP: Jailyn Chawla MD  Last diagnosis associated with med order: 1. Benign Essential Hypertension  - hydroCHLOROthiazide (MICROZIDE) 12.5 mg capsule; TAKE 1 CAPSULE BY MOUTH EVERY DAY  Dispense: 90 capsule; Refill: 2    If protocol passes may refill for 12 months if within 3 months of last provider visit (or a total of 15 months).             Passed - Serum Potassium in past 12 months      Lab Results   Component Value Date    Potassium 4.2 01/27/2021             Passed - Serum Sodium in past 12 months      Lab Results   Component Value Date    Sodium 140 01/27/2021             Passed - Blood pressure on file in past 12 months     BP Readings from Last 1 Encounters:   01/27/21 156/70             Passed - Serum Creatinine in past 12 months      Creatinine   Date Value Ref Range Status   01/27/2021 1.11 (H) 0.60 - 1.10 mg/dL Final

## 2021-06-29 NOTE — PROGRESS NOTES
Progress Notes by Yolanda Story MA at 11/10/2020 10:30 AM     Author: Yolanda Story MA Service: -- Author Type: Medical Assistant    Filed: 11/10/2020 10:39 AM Encounter Date: 11/10/2020 Status: Attested    : Yolanda Story MA (Medical Assistant) Cosigner: Jailyn Chawla MD at 11/10/2020  2:30 PM    Attestation signed by Jailyn Chawla MD at 11/10/2020  2:30 PM    No change to plan of care.  SAUNDRA                 I met with Lidya Dejesus at the request of Dr. Chawla to recheck her blood pressure.  Blood pressure medications on the MAR were reviewed with patient.    Patient has taken all medications as per usual regimen: Yes  Patient reports tolerating them without any issues or concerns: Yes    Vitals:    11/10/20 1030 11/10/20 1036   BP: 180/84 146/82   Patient Site: Left Arm Left Arm   Patient Position: Sitting Sitting   Cuff Size: Adult Regular Adult Regular       After 5 minutes, the patient's blood pressure was < 140/90, the previous encounter was reviewed, recorded blood pressure below 140/90.  Patient was discharged and the note will be sent to the provider for final review.    Patient's blood pressure machine read at 167/91.

## 2021-07-07 ENCOUNTER — HOSPITAL ENCOUNTER (OUTPATIENT)
Dept: NUCLEAR MEDICINE | Facility: CLINIC | Age: 81
Discharge: HOME | End: 2021-07-07
Attending: FAMILY MEDICINE

## 2021-07-07 ENCOUNTER — HOSPITAL ENCOUNTER (OUTPATIENT)
Dept: CARDIOLOGY | Facility: CLINIC | Age: 81
Discharge: HOME | End: 2021-07-07
Attending: FAMILY MEDICINE

## 2021-07-07 DIAGNOSIS — R06.09 EXERTIONAL DYSPNEA: ICD-10-CM

## 2021-07-07 LAB
CV STRESS CURRENT BP HE: NORMAL
CV STRESS CURRENT HR HE: 100
CV STRESS CURRENT HR HE: 100
CV STRESS CURRENT HR HE: 101
CV STRESS CURRENT HR HE: 102
CV STRESS CURRENT HR HE: 102
CV STRESS CURRENT HR HE: 108
CV STRESS CURRENT HR HE: 70
CV STRESS CURRENT HR HE: 72
CV STRESS CURRENT HR HE: 85
CV STRESS CURRENT HR HE: 87
CV STRESS CURRENT HR HE: 88
CV STRESS CURRENT HR HE: 90
CV STRESS CURRENT HR HE: 93
CV STRESS CURRENT HR HE: 94
CV STRESS CURRENT HR HE: 95
CV STRESS CURRENT HR HE: 96
CV STRESS CURRENT HR HE: 96
CV STRESS CURRENT HR HE: 99
CV STRESS DEVIATION TIME HE: NORMAL
CV STRESS ECHO PERCENT HR HE: NORMAL
CV STRESS EXERCISE STAGE HE: NORMAL
CV STRESS FINAL RESTING BP HE: NORMAL
CV STRESS FINAL RESTING HR HE: 87
CV STRESS MAX HR HE: 108
CV STRESS MAX TREADMILL GRADE HE: 0
CV STRESS MAX TREADMILL SPEED HE: 0
CV STRESS PEAK DIA BP HE: NORMAL
CV STRESS PEAK SYS BP HE: NORMAL
CV STRESS PHASE HE: NORMAL
CV STRESS PROTOCOL HE: NORMAL
CV STRESS RESTING PT POSITION HE: NORMAL
CV STRESS RESTING PT POSITION HE: NORMAL
CV STRESS ST DEVIATION AMOUNT HE: NORMAL
CV STRESS ST DEVIATION ELEVATION HE: NORMAL
CV STRESS ST EVELATION AMOUNT HE: NORMAL
CV STRESS TEST TYPE HE: NORMAL
CV STRESS TOTAL STAGE TIME MIN 1 HE: NORMAL
NUC STRESS EJECTION FRACTION: 74 %
RATE PRESSURE PRODUCT: NORMAL
STRESS ECHO BASELINE DIASTOLIC HE: 70
STRESS ECHO BASELINE HR: 69
STRESS ECHO BASELINE SYSTOLIC BP: 142
STRESS ECHO CALCULATED PERCENT HR: 77 %
STRESS ECHO LAST STRESS DIASTOLIC BP: 75
STRESS ECHO LAST STRESS HR: 93
STRESS ECHO LAST STRESS SYSTOLIC BP: 179
STRESS ECHO POST ESTIMATED WORKLOAD: 3
STRESS ECHO POST EXERCISE DUR MIN: 6
STRESS ECHO POST EXERCISE DUR SEC: 43
STRESS ECHO TARGET HR: 141

## 2021-07-13 ENCOUNTER — RECORDS - HEALTHEAST (OUTPATIENT)
Dept: ADMINISTRATIVE | Facility: CLINIC | Age: 81
End: 2021-07-13

## 2021-07-14 PROBLEM — I10 HYPERTENSION: Status: RESOLVED | Noted: 2019-04-11 | Resolved: 2021-01-26

## 2022-03-23 DIAGNOSIS — I10 ESSENTIAL (PRIMARY) HYPERTENSION: ICD-10-CM

## 2022-03-24 NOTE — TELEPHONE ENCOUNTER
"Routing refill request to provider for review/approval because:  Labs not current:  Multiple  BP not current    Last Written Prescription Date:  6/25/21  Last Fill Quantity: 90,  # refills: 2   Last office visit provider:  12/10/21     Requested Prescriptions   Pending Prescriptions Disp Refills     hydrochlorothiazide (MICROZIDE) 12.5 MG capsule [Pharmacy Med Name: HYDROCHLOROTHIAZIDE 12.5 MG CP] 90 capsule 2     Sig: TAKE 1 CAPSULE BY MOUTH EVERY DAY       Diuretics (Including Combos) Protocol Failed - 3/24/2022  8:39 AM        Failed - Blood pressure under 140/90 in past 12 months     BP Readings from Last 3 Encounters:   01/27/21 (!) 156/70   11/10/20 (!) 146/82                 Failed - Recent (12 mo) or future (30 days) visit within the authorizing provider's specialty     Patient has had an office visit with the authorizing provider or a provider within the authorizing providers department within the previous 12 mos or has a future within next 30 days. See \"Patient Info\" tab in inbasket, or \"Choose Columns\" in Meds & Orders section of the refill encounter.              Failed - Normal serum creatinine on file in past 12 months     Recent Labs   Lab Test 01/27/21  1129   CR 1.11*              Failed - Normal serum potassium on file in past 12 months     Recent Labs   Lab Test 01/27/21  1129   POTASSIUM 4.2                    Failed - Normal serum sodium on file in past 12 months     Recent Labs   Lab Test 01/27/21  1129                 Passed - Medication is active on med list        Passed - Patient is age 18 or older        Passed - No active pregancy on record        Passed - No positive pregnancy test in past 12 months             Vinicio Chong RN 03/24/22 8:39 AM  "

## 2022-03-25 RX ORDER — HYDROCHLOROTHIAZIDE 12.5 MG/1
CAPSULE ORAL
Qty: 90 CAPSULE | Refills: 2 | Status: SHIPPED | OUTPATIENT
Start: 2022-03-25 | End: 2022-12-31

## 2022-03-27 ENCOUNTER — HEALTH MAINTENANCE LETTER (OUTPATIENT)
Age: 82
End: 2022-03-27

## 2022-09-25 ENCOUNTER — HEALTH MAINTENANCE LETTER (OUTPATIENT)
Age: 82
End: 2022-09-25

## 2022-10-27 DIAGNOSIS — I10 ESSENTIAL HYPERTENSION, BENIGN: ICD-10-CM

## 2022-10-28 RX ORDER — IRBESARTAN 150 MG/1
150 TABLET ORAL DAILY
Qty: 90 TABLET | Refills: 0 | Status: SHIPPED | OUTPATIENT
Start: 2022-10-28 | End: 2023-06-06

## 2022-10-28 NOTE — TELEPHONE ENCOUNTER
"Routing refill request to provider for review/approval because:  Labs not current:  Multiple  BP not current    Last Written Prescription Date:  9/25/22  Last Fill Quantity: 30,  # refills: 0   Last office visit provider:  12/10/21 - scheduled 11/10/22    Requested Prescriptions   Pending Prescriptions Disp Refills     irbesartan (AVAPRO) 150 MG tablet [Pharmacy Med Name: IRBESARTAN 150 MG TABLET] 30 tablet 0     Sig: TAKE 1 TABLET (150 MG) BY MOUTH DAILY **OFFICE VISIT REQUIRED FOR FURTHER REFILLS**       Angiotensin-II Receptors Failed - 10/28/2022 10:29 AM        Failed - Last blood pressure under 140/90 in past 12 months     BP Readings from Last 3 Encounters:   01/27/21 (!) 156/70   11/10/20 (!) 146/82                 Failed - Normal serum creatinine on file in past 12 months     Recent Labs   Lab Test 01/27/21  1129   CR 1.11*       Ok to refill medication if creatinine is low          Failed - Normal serum potassium on file in past 12 months     Recent Labs   Lab Test 01/27/21  1129   POTASSIUM 4.2                    Passed - Recent (12 mo) or future (30 days) visit within the authorizing provider's specialty     Patient has had an office visit with the authorizing provider or a provider within the authorizing providers department within the previous 12 mos or has a future within next 30 days. See \"Patient Info\" tab in inbasket, or \"Choose Columns\" in Meds & Orders section of the refill encounter.              Passed - Medication is active on med list        Passed - Patient is age 18 or older        Passed - No active pregnancy on record        Passed - No positive pregnancy test in past 12 months             Vinicio Chong RN 10/28/22 10:29 AM  "

## 2022-11-10 ENCOUNTER — OFFICE VISIT (OUTPATIENT)
Dept: FAMILY MEDICINE | Facility: CLINIC | Age: 82
End: 2022-11-10
Payer: MEDICARE

## 2022-11-10 VITALS
HEART RATE: 64 BPM | RESPIRATION RATE: 16 BRPM | TEMPERATURE: 97.5 F | OXYGEN SATURATION: 99 % | BODY MASS INDEX: 29.19 KG/M2 | WEIGHT: 154.6 LBS | HEIGHT: 61 IN | DIASTOLIC BLOOD PRESSURE: 80 MMHG | SYSTOLIC BLOOD PRESSURE: 136 MMHG

## 2022-11-10 DIAGNOSIS — E78.00 PURE HYPERCHOLESTEROLEMIA: ICD-10-CM

## 2022-11-10 DIAGNOSIS — Z85.3 HISTORY OF BREAST CANCER: ICD-10-CM

## 2022-11-10 DIAGNOSIS — N18.31 STAGE 3A CHRONIC KIDNEY DISEASE (H): ICD-10-CM

## 2022-11-10 DIAGNOSIS — Z00.00 MEDICARE ANNUAL WELLNESS VISIT, SUBSEQUENT: Primary | ICD-10-CM

## 2022-11-10 DIAGNOSIS — M81.0 AGE-RELATED OSTEOPOROSIS WITHOUT CURRENT PATHOLOGICAL FRACTURE: ICD-10-CM

## 2022-11-10 DIAGNOSIS — K21.9 GASTROESOPHAGEAL REFLUX DISEASE WITHOUT ESOPHAGITIS: ICD-10-CM

## 2022-11-10 DIAGNOSIS — I10 ESSENTIAL HYPERTENSION, BENIGN: ICD-10-CM

## 2022-11-10 LAB
ALBUMIN SERPL BCG-MCNC: 4.7 G/DL (ref 3.5–5.2)
ALBUMIN UR-MCNC: NEGATIVE MG/DL
ANION GAP SERPL CALCULATED.3IONS-SCNC: 11 MMOL/L (ref 7–15)
APPEARANCE UR: CLEAR
BILIRUB UR QL STRIP: NEGATIVE
BUN SERPL-MCNC: 21.1 MG/DL (ref 8–23)
CALCIUM SERPL-MCNC: 10.3 MG/DL (ref 8.8–10.2)
CHLORIDE SERPL-SCNC: 102 MMOL/L (ref 98–107)
CHOLEST SERPL-MCNC: 227 MG/DL
COLOR UR AUTO: YELLOW
CREAT SERPL-MCNC: 0.95 MG/DL (ref 0.51–0.95)
DEPRECATED CALCIDIOL+CALCIFEROL SERPL-MC: 47 UG/L (ref 20–75)
DEPRECATED HCO3 PLAS-SCNC: 28 MMOL/L (ref 22–29)
ERYTHROCYTE [DISTWIDTH] IN BLOOD BY AUTOMATED COUNT: 13.3 % (ref 10–15)
GFR SERPL CREATININE-BSD FRML MDRD: 60 ML/MIN/1.73M2
GLUCOSE SERPL-MCNC: 94 MG/DL (ref 70–99)
GLUCOSE UR STRIP-MCNC: NEGATIVE MG/DL
HCT VFR BLD AUTO: 39.3 % (ref 35–47)
HDLC SERPL-MCNC: 72 MG/DL
HGB BLD-MCNC: 12.6 G/DL (ref 11.7–15.7)
HGB UR QL STRIP: NEGATIVE
KETONES UR STRIP-MCNC: NEGATIVE MG/DL
LDLC SERPL CALC-MCNC: 139 MG/DL
LEUKOCYTE ESTERASE UR QL STRIP: NEGATIVE
MCH RBC QN AUTO: 29.2 PG (ref 26.5–33)
MCHC RBC AUTO-ENTMCNC: 32.1 G/DL (ref 31.5–36.5)
MCV RBC AUTO: 91 FL (ref 78–100)
NITRATE UR QL: NEGATIVE
NONHDLC SERPL-MCNC: 155 MG/DL
PH UR STRIP: 5.5 [PH] (ref 5–8)
PHOSPHATE SERPL-MCNC: 3.7 MG/DL (ref 2.5–4.5)
PLATELET # BLD AUTO: 298 10E3/UL (ref 150–450)
POTASSIUM SERPL-SCNC: 3.8 MMOL/L (ref 3.4–5.3)
PTH-INTACT SERPL-MCNC: 16 PG/ML (ref 15–65)
RBC # BLD AUTO: 4.31 10E6/UL (ref 3.8–5.2)
SODIUM SERPL-SCNC: 141 MMOL/L (ref 136–145)
SP GR UR STRIP: <=1.005 (ref 1–1.03)
TRIGL SERPL-MCNC: 78 MG/DL
URATE SERPL-MCNC: 5.6 MG/DL (ref 2.4–5.7)
UROBILINOGEN UR STRIP-ACNC: 0.2 E.U./DL
WBC # BLD AUTO: 7.2 10E3/UL (ref 4–11)

## 2022-11-10 PROCEDURE — G0439 PPPS, SUBSEQ VISIT: HCPCS | Performed by: FAMILY MEDICINE

## 2022-11-10 PROCEDURE — 82306 VITAMIN D 25 HYDROXY: CPT | Performed by: FAMILY MEDICINE

## 2022-11-10 PROCEDURE — 80061 LIPID PANEL: CPT | Performed by: FAMILY MEDICINE

## 2022-11-10 PROCEDURE — 84550 ASSAY OF BLOOD/URIC ACID: CPT | Performed by: FAMILY MEDICINE

## 2022-11-10 PROCEDURE — 85027 COMPLETE CBC AUTOMATED: CPT | Performed by: FAMILY MEDICINE

## 2022-11-10 PROCEDURE — 80069 RENAL FUNCTION PANEL: CPT | Performed by: FAMILY MEDICINE

## 2022-11-10 PROCEDURE — 81003 URINALYSIS AUTO W/O SCOPE: CPT | Performed by: FAMILY MEDICINE

## 2022-11-10 PROCEDURE — 83970 ASSAY OF PARATHORMONE: CPT | Performed by: FAMILY MEDICINE

## 2022-11-10 PROCEDURE — 36415 COLL VENOUS BLD VENIPUNCTURE: CPT | Performed by: FAMILY MEDICINE

## 2022-11-10 ASSESSMENT — PAIN SCALES - GENERAL: PAINLEVEL: NO PAIN (0)

## 2022-11-10 NOTE — PATIENT INSTRUCTIONS
Patient Education   Personalized Prevention Plan  You are due for the preventive services outlined below.  Your care team is available to assist you in scheduling these services.  If you have already completed any of these items, please share that information with your care team to update in your medical record.  Health Maintenance Due   Topic Date Due     Osteoporosis Screening  Never done     Kidney Microalbumin Urine Test  Never done     ANNUAL REVIEW OF HM ORDERS  Never done     Urine Test  Never done     Zoster (Shingles) Vaccine (2 of 3) 12/11/2007     Basic Metabolic Panel  01/27/2022     Cholesterol Lab  01/27/2022     Hemoglobin  01/27/2022

## 2022-11-10 NOTE — PROGRESS NOTES
SUBJECTIVE:   Lidya is a 82 year old who presents for Preventive Visit.      Patient has been advised of split billing requirements and indicates understanding: Yes  Are you in the first 12 months of your Medicare coverage?  No    No significant changes in health.  Tolerating irbesartan and hydrochlorothiazide well and management of hypertension.  Denies lightheadedness, dizziness, headaches, chest pain, dyspnea, or swelling.  Does not check home blood pressures.  Due for follow-up of dyslipidemia, no treatments for this.  Occasionally taking Zantac for reflux, that works well.  History of osteoporosis, she is not interested in further evaluation or treatment.  Prior history of breast cancer status post right mastectomy, doing well.  We reviewed previous labs suggestive of chronic kidney disease stage IIIa, we will reassess this today.    She is single, lives alone.  Working about 44 hours/week at [employer] but enjoying this, would prefer to work about 32 hours.  Remains physically active there.    History of Present Illness       Reason for visit:  Wellness visit    She eats 0-1 servings of fruits and vegetables daily.She consumes 0 sweetened beverage(s) daily.She exercises with enough effort to increase her heart rate 10 to 19 minutes per day.  She exercises with enough effort to increase her heart rate 3 or less days per week.   She is taking medications regularly.    Do you feel safe in your environment? Yes    Have you ever done Advance Care Planning? (For example, a Health Directive, POLST, or a discussion with a medical provider or your loved ones about your wishes): Yes, advance care planning is on file.       Fall risk  Fallen 2 or more times in the past year?: No  Any fall with injury in the past year?: No    Cognitive Screening   1) Repeat 3 items (Leader, Season, Table)    2) Clock draw: NORMAL  3) 3 item recall: Recalls 3 objects  Results: 3 items recalled: COGNITIVE IMPAIRMENT LESS LIKELY    Mini-CogTM  Copyright ALMA DELIA Poole. Licensed by the author for use in [facility]; reprinted with permission (ingrid@[facility]). All rights reserved.        Reviewed and updated as needed this visit by clinical staff   Tobacco  Allergies  Meds              Reviewed and updated as needed this visit by Provider                 Social History     Tobacco Use     Smoking status: Never     Smokeless tobacco: Never   Substance Use Topics     Alcohol use: No         No flowsheet data found.      Current providers sharing in care for this patient include:   Patient Care Team:  Jailyn Chawla MD as PCP - General (Family Practice)  Jailyn Chawla MD as Assigned PCP    The following health maintenance items are reviewed in Epic and correct as of today:  Health Maintenance   Topic Date Due     DEXA  Never done     ANNUAL REVIEW OF HM ORDERS  Never done     ZOSTER IMMUNIZATION (2 of 3) 12/11/2007     MEDICARE ANNUAL WELLNESS VISIT  01/27/2022     DTAP/TDAP/TD IMMUNIZATION (2 - Td or Tdap) 11/06/2023     FALL RISK ASSESSMENT  11/10/2023     ADVANCE CARE PLANNING  01/27/2026     PHQ-2 (once per calendar year)  Completed     INFLUENZA VACCINE  Completed     Pneumococcal Vaccine: 65+ Years  Completed     COVID-19 Vaccine  Completed     IPV IMMUNIZATION  Aged Out     MENINGITIS IMMUNIZATION  Aged Out     Lab work is in process  Labs reviewed in EPIC  BP Readings from Last 3 Encounters:   11/10/22 136/80   01/27/21 (!) 156/70   11/10/20 (!) 146/82    Wt Readings from Last 3 Encounters:   11/10/22 70.1 kg (154 lb 9.6 oz)   01/27/21 70.8 kg (156 lb)   05/29/20 68.3 kg (150 lb 8 oz)                  Patient Active Problem List   Diagnosis     Hypercholesterolemia     Glaucoma     Benign Essential Hypertension     Esophageal Reflux     Osteoporosis     History of breast cancer     Status post total replacement of right hip     Stage 3a chronic kidney disease (H)     Past Surgical History:   Procedure Laterality Date     HC REMOVAL OF  TONSILS,<12 Y/O      Description: Tonsillectomy;  Recorded: 10/23/2008;     MASTECTOMY  1990     [place] SHARON HOLE EVAC SUBDUR/EXTRA HEMATOMA      Description: [place] [facility]oles For Evacuation Of Subdural Hematoma;  Recorded: 10/23/2008;     ZZC TOTAL HIP ARTHROPLASTY Right 2/6/2019    Procedure: RIGHT TOTAL HIP ARTHROPLASTY DIRECT ANTERIOR APPROACH;  Surgeon: Luan Gerber MD;  Location: [facility];  Service: Orthopedics       Social History     Tobacco Use     Smoking status: Never     Smokeless tobacco: Never   Substance Use Topics     Alcohol use: No     Family History   Problem Relation Age of Onset     Chronic Obstructive Pulmonary Disease Brother      Heart Disease Brother      Crohn's Disease Daughter      Cerebrovascular Disease No family hx of      Diabetes No family hx of      Clotting Disorder No family hx of          Current Outpatient Medications   Medication Sig Dispense Refill     bimatoprost (LUMIGAN) 0.01 % Drop [BIMATOPROST (LUMIGAN) 0.01 % DROP] Administer 1 drop to both eyes at bedtime.              hydrochlorothiazide (MICROZIDE) 12.5 MG capsule TAKE 1 CAPSULE BY MOUTH EVERY DAY 90 capsule 2     irbesartan (AVAPRO) 150 MG tablet TAKE 1 TABLET (150 MG) BY MOUTH DAILY **OFFICE VISIT REQUIRED FOR FURTHER REFILLS** 90 tablet 0     Allergies   Allergen Reactions     Ace Inhibitors Cough     Combigan [Brimonidine Tartrate-Timolol] Other (See Comments)     Reddened eyes     Recent Labs   Lab Test 01/27/21  1129 04/11/19  1000 01/24/18  1003 02/11/15  1141     --   --  164*   HDL 73  --   --   --    TRIG 74  --   --   --    ALT 29  --   --   --    CR 1.11* 0.95   < >  --    GFRESTIMATED 47* 57*   < >  --    GFRESTBLACK 57* >60   < >  --    POTASSIUM 4.2 4.2   < >  --     < > = values in this interval not displayed.      Pertinent mammograms are reviewed under the imaging tab.    Review of Systems  Constitutional, HEENT, cardiovascular, pulmonary, GI, , musculoskeletal, neuro, skin, endocrine  "and psych systems are negative, except as otherwise noted.    OBJECTIVE:   /80   Pulse 64   Temp 97.5  F (36.4  C) (Oral)   Resp 16   Ht 1.549 m (5' 1\")   Wt 70.1 kg (154 lb 9.6 oz)   SpO2 99%   BMI 29.21 kg/m   Estimated body mass index is 29.21 kg/m  as calculated from the following:    Height as of this encounter: 1.549 m (5' 1\").    Weight as of this encounter: 70.1 kg (154 lb 9.6 oz).  Physical Exam  GENERAL APPEARANCE: healthy, alert and no distress  EYES: Eyes grossly normal to inspection, PERRL and conjunctivae and sclerae normal  HENT: ear canals and TM's normal, nose and mouth without ulcers or lesions, oropharynx clear and oral mucous membranes moist  NECK: no adenopathy, no asymmetry, masses, or scars and thyroid normal to palpation  RESP: lungs clear to auscultation - no rales, rhonchi or wheezes  BREAST: Right chest wall status postmastectomy and left breast normal without masses, tenderness or nipple discharge and no palpable axillary masses or adenopathy  CV: regular rate and rhythm, normal S1 S2, no S3 or S4, no murmur, click or rub, no peripheral edema and peripheral pulses strong  ABDOMEN: soft, nontender, no hepatosplenomegaly, no masses and bowel sounds normal  MS: no musculoskeletal defects are noted and gait is age appropriate without ataxia  SKIN: no suspicious lesions or rashes  NEURO: Normal strength and tone, sensory exam grossly normal, mentation intact and speech normal  PSYCH: mentation appears normal and affect normal/bright        ASSESSMENT / PLAN:     Medicare annual wellness visit, subsequent  At today's visit, we discussed lifestyle interventions to promote self-management and wellness, including maintenance of a healthy weight, healthy diet, regular physical activity and exercise, and falls prevention.  Up-to-date with vaccinations, encouraged consideration of Shingrix.  We will screen for diabetes with fasting glucose today.  Will obtain screening lipids today.  She " "declines bone density screening.    Benign Essential Hypertension  Encouraged efforts at healthy lifestyle habits.  We will check renal function and electrolytes today.  Continue irbesartan and hydrochlorothiazide.  - REVIEW OF HEALTH MAINTENANCE PROTOCOL ORDERS    Hypercholesterolemia  Encouraged healthy lifestyle habits.  We will check fasting lipids.  - Lipid panel reflex to direct LDL Fasting; Future  - Lipid panel reflex to direct LDL Fasting    Gastroesophageal reflux disease without esophagitis  Continue Zantac as needed, dietary modification.    Age-related osteoporosis without current pathological fracture  Encourage weightbearing exercises, measures to reduce risk of falls, and adequate calcium and vitamin D intake.  We will check vitamin D level today.  - Vitamin D Deficiency; Future  - Vitamin D Deficiency    History of breast cancer  No evidence of recurrence.  We will check CBC today to assess for metastatic disease.    Stage 3a chronic kidney disease (H)  We review chronic kidney disease status, further evaluation, and monitoring.  Discussed avoidance of nephrotoxic agents.  We will obtain labs as noted to assess for complications.  - Renal panel; Future  - CBC with platelets; Future  - Parathyroid Hormone Intact; Future  - UA reflex to Microscopic and Culture; Future  - Uric acid; Future  - UA reflex to Microscopic and Culture  - Renal panel  - CBC with platelets  - Parathyroid Hormone Intact  - Uric acid     Patient has been advised of split billing requirements and indicates understanding: Yes      COUNSELING:  Reviewed preventive health counseling, as reflected in patient instructions    Estimated body mass index is 29.21 kg/m  as calculated from the following:    Height as of this encounter: 1.549 m (5' 1\").    Weight as of this encounter: 70.1 kg (154 lb 9.6 oz).    Weight management plan: Discussed healthy diet and exercise guidelines    She reports that she has never smoked. She has never used " smokeless tobacco.      Appropriate preventive services were discussed with this patient, including applicable screening as appropriate for cardiovascular disease, diabetes, osteopenia/osteoporosis, and glaucoma.  As appropriate for age/gender, discussed screening for colorectal cancer, prostate cancer, breast cancer, and cervical cancer. Checklist reviewing preventive services available has been given to the patient.    Reviewed patients plan of care and provided an AVS. The Basic Care Plan (routine screening as documented in Health Maintenance) for Lidya meets the Care Plan requirement. This Care Plan has been established and reviewed with the Patient.    Counseling Resources:  ATP IV Guidelines  Pooled Cohorts Equation Calculator  Breast Cancer Risk Calculator  Breast Cancer: Medication to Reduce Risk  FRAX Risk Assessment  ICSI Preventive Guidelines  Dietary Guidelines for Americans, 2010  [employer]'s MyPlate  ASA Prophylaxis  Lung CA Screening    Jailyn Chawla MD  [facility]    Identified Health Risks:  Very mild bladder leakage, declines additional resources or information.    She denies difficulties with hearing, mental health, balance, completion of activities of daily living.

## 2022-12-30 DIAGNOSIS — I10 ESSENTIAL (PRIMARY) HYPERTENSION: ICD-10-CM

## 2022-12-31 RX ORDER — HYDROCHLOROTHIAZIDE 12.5 MG/1
1 CAPSULE ORAL DAILY
Qty: 90 CAPSULE | Refills: 3 | Status: SHIPPED | OUTPATIENT
Start: 2022-12-31 | End: 2024-01-25

## 2022-12-31 NOTE — TELEPHONE ENCOUNTER
"Last Written Prescription Date:  3/25/22  Last Fill Quantity: 90,  # refills: 2   Last office visit provider:  11/10/22     Requested Prescriptions   Pending Prescriptions Disp Refills     hydrochlorothiazide (MICROZIDE) 12.5 MG capsule [Pharmacy Med Name: HYDROCHLOROTHIAZIDE 12.5 MG CP] 90 capsule 2     Sig: TAKE 1 CAPSULE BY MOUTH EVERY DAY       Diuretics (Including Combos) Protocol Passed - 12/31/2022  8:18 AM        Passed - Blood pressure under 140/90 in past 12 months     BP Readings from Last 3 Encounters:   11/10/22 136/80   01/27/21 (!) 156/70   11/10/20 (!) 146/82                 Passed - Recent (12 mo) or future (30 days) visit within the authorizing provider's specialty     Patient has had an office visit with the authorizing provider or a provider within the authorizing providers department within the previous 12 mos or has a future within next 30 days. See \"Patient Info\" tab in inbasket, or \"Choose Columns\" in Meds & Orders section of the refill encounter.              Passed - Medication is active on med list        Passed - Patient is age 18 or older        Passed - No active pregancy on record        Passed - Normal serum creatinine on file in past 12 months     Recent Labs   Lab Test 11/10/22  1139   CR 0.95              Passed - Normal serum potassium on file in past 12 months     Recent Labs   Lab Test 11/10/22  1139   POTASSIUM 3.8                    Passed - Normal serum sodium on file in past 12 months     Recent Labs   Lab Test 11/10/22  1139                 Passed - No positive pregnancy test in past 12 months             Vinicio Chong RN 12/31/22 8:19 AM  "

## 2023-03-15 ENCOUNTER — LAB (OUTPATIENT)
Dept: LAB | Facility: CLINIC | Age: 83
End: 2023-03-15
Attending: FAMILY MEDICINE
Payer: MEDICARE

## 2023-03-15 DIAGNOSIS — Z20.822 SUSPECTED 2019 NOVEL CORONAVIRUS INFECTION: ICD-10-CM

## 2023-03-15 PROCEDURE — U0003 INFECTIOUS AGENT DETECTION BY NUCLEIC ACID (DNA OR RNA); SEVERE ACUTE RESPIRATORY SYNDROME CORONAVIRUS 2 (SARS-COV-2) (CORONAVIRUS DISEASE [COVID-19]), AMPLIFIED PROBE TECHNIQUE, MAKING USE OF HIGH THROUGHPUT TECHNOLOGIES AS DESCRIBED BY CMS-2020-01-R: HCPCS

## 2023-03-15 PROCEDURE — U0005 INFEC AGEN DETEC AMPLI PROBE: HCPCS

## 2023-03-16 ENCOUNTER — OFFICE VISIT (OUTPATIENT)
Dept: INTERNAL MEDICINE | Facility: CLINIC | Age: 83
End: 2023-03-16
Payer: MEDICARE

## 2023-03-16 ENCOUNTER — NURSE TRIAGE (OUTPATIENT)
Dept: NURSING | Facility: CLINIC | Age: 83
End: 2023-03-16

## 2023-03-16 ENCOUNTER — ANCILLARY PROCEDURE (OUTPATIENT)
Dept: GENERAL RADIOLOGY | Facility: CLINIC | Age: 83
End: 2023-03-16
Attending: INTERNAL MEDICINE
Payer: MEDICARE

## 2023-03-16 VITALS
WEIGHT: 159.4 LBS | HEIGHT: 61 IN | TEMPERATURE: 100.4 F | HEART RATE: 94 BPM | OXYGEN SATURATION: 97 % | DIASTOLIC BLOOD PRESSURE: 80 MMHG | SYSTOLIC BLOOD PRESSURE: 120 MMHG | BODY MASS INDEX: 30.09 KG/M2 | RESPIRATION RATE: 18 BRPM

## 2023-03-16 DIAGNOSIS — R50.9 FEVER, UNSPECIFIED FEVER CAUSE: ICD-10-CM

## 2023-03-16 DIAGNOSIS — R09.81 CONGESTION OF PARANASAL SINUS: ICD-10-CM

## 2023-03-16 DIAGNOSIS — R06.00 DYSPNEA, UNSPECIFIED TYPE: ICD-10-CM

## 2023-03-16 DIAGNOSIS — I10 ESSENTIAL HYPERTENSION, BENIGN: ICD-10-CM

## 2023-03-16 DIAGNOSIS — R05.1 ACUTE COUGH: Primary | ICD-10-CM

## 2023-03-16 DIAGNOSIS — R05.1 ACUTE COUGH: ICD-10-CM

## 2023-03-16 LAB — SARS-COV-2 RNA RESP QL NAA+PROBE: NEGATIVE

## 2023-03-16 PROCEDURE — 71046 X-RAY EXAM CHEST 2 VIEWS: CPT | Mod: TC | Performed by: RADIOLOGY

## 2023-03-16 PROCEDURE — 99204 OFFICE O/P NEW MOD 45 MIN: CPT | Performed by: INTERNAL MEDICINE

## 2023-03-16 RX ORDER — AZITHROMYCIN 250 MG/1
TABLET, FILM COATED ORAL
Qty: 6 TABLET | Refills: 0 | Status: SHIPPED | OUTPATIENT
Start: 2023-03-16 | End: 2023-03-21

## 2023-03-16 NOTE — PROGRESS NOTES
"  Assessment & Plan     Acute cough    - XR Chest 2 Views; Future  - azithromycin (ZITHROMAX) 250 MG tablet; Take 2 tablets (500 mg) by mouth daily for 1 day, THEN 1 tablet (250 mg) daily for 4 days.    Congestion of paranasal sinus    - azithromycin (ZITHROMAX) 250 MG tablet; Take 2 tablets (500 mg) by mouth daily for 1 day, THEN 1 tablet (250 mg) daily for 4 days.    Fever, unspecified fever cause    - azithromycin (ZITHROMAX) 250 MG tablet; Take 2 tablets (500 mg) by mouth daily for 1 day, THEN 1 tablet (250 mg) daily for 4 days.    Dyspnea, unspecified type    - XR Chest 2 Views; Future      81 yo female with HTN, CKD3, has sinus congestion, cough with no hemoptysis, mild dyspnea and low grade fever for 3 days. Not able to sleep because of nasal congestion. Tried nasal saline, did not help.  Covid test at home negative, Covid PCR done yesterday - pending.  No chest pain.    Plan: CXR today since has cough and dyspnea. Treatment for sinus congestion with cough and cold medications OTC that are not increasing the blood pressure, Coricidin, discussed. Appropriate use of Afrin discussed. Rx for Z-marky is sent.    Patient Instructions   For nasal congestion, you can try Coricidin.  You can Afrin nasal spray and you can use it for 3-4 days.    I will send you Rx for antibiotic.    CXR today.         Return in about 1 week (around 3/23/2023) for Follow up.    Jeannette Flores MD  [facility]    Arron Bose is a 82 year old, presenting for the following health issues:  Possible Pneumonia (Possible Pneumonia-Caught cold couple days ago and has gotten worse-Coughing Worse at night-Head Fills Full)      HPI           Review of Systems         Objective    /80   Pulse 94   Temp 100.4  F (38  C)   Resp 18   Ht 1.549 m (5' 1\")   Wt 72.3 kg (159 lb 6.4 oz)   SpO2 97%   BMI 30.12 kg/m    Body mass index is 30.12 kg/m .  Physical Exam   Constitutional:  oriented to person, place, " and time, appears well-nourished. No distress.   HENT:   Head: Normocephalic.   Mouth/Throat: Oropharynx is clear and moist.   Eyes: Conjunctivae are normal. Pupils are equal, round, and reactive to light.   Neck: Normal range of motion. Neck supple.   Cardiovascular: Normal rate, regular rhythm and normal heart sounds.    Pulmonary/Chest: Effort normal and breath sounds normal.  Musculoskeletal: Normal range of motion.   Neurological: alert and oriented to person, place, and time. Skin: Skin is warm.   Psychiatric: normal mood and affect.

## 2023-03-16 NOTE — PATIENT INSTRUCTIONS
For nasal congestion, you can try Coricidin.  You can Afrin nasal spray and you can use it for 3-4 days.    I will send you Rx for antibiotic.    CXR today.

## 2023-03-16 NOTE — TELEPHONE ENCOUNTER
Stated she thinks she has pneumonia.  Head is full. Color clear.  Didn't sleep.  Cough. Dry.  Breathing is a little labored.  Temp 99.8 oral.  Feels weak when she gets up.  Denies chest pain.  Slight headache she thinks is because her sinuses are so full.    Covid test done yesterday. Still in process.  Per protocol, I recommended she be seen in clinic, now. Instructed to be seen in  if no appointments available.  Questions answered.  Transferred to scheduling.      Reason for Disposition    MILD difficulty breathing (e.g., minimal/no SOB at rest, SOB with walking, pulse <100) and still present when not coughing    Additional Information    Negative: Bluish (or gray) lips or face    Negative: SEVERE difficulty breathing (e.g., struggling for each breath, speaks in single words)    Negative: Rapid onset of cough and has hives    Negative: Coughing started suddenly after medicine, an allergic food or bee sting    Negative: Difficulty breathing after exposure to flames, smoke, or fumes    Negative: Sounds like a life-threatening emergency to the triager    Negative: MODERATE difficulty breathing (e.g., speaks in phrases, SOB even at rest, pulse 100-120) and still present when not coughing    Negative: Chest pain present when not coughing    Negative: Passed out (i.e., fainted, collapsed and was not responding)    Negative: Patient sounds very sick or weak to the triager    Protocols used: COUGH-A-OH    Rylee BUITRAGO RN [facility]

## 2023-06-06 DIAGNOSIS — I10 ESSENTIAL HYPERTENSION, BENIGN: ICD-10-CM

## 2023-06-06 RX ORDER — IRBESARTAN 150 MG/1
150 TABLET ORAL DAILY
Qty: 90 TABLET | Refills: 3 | Status: SHIPPED | OUTPATIENT
Start: 2023-06-06 | End: 2024-02-14

## 2023-08-10 ENCOUNTER — OFFICE VISIT (OUTPATIENT)
Dept: FAMILY MEDICINE | Facility: CLINIC | Age: 83
End: 2023-08-10
Payer: MEDICARE

## 2023-08-10 ENCOUNTER — ANCILLARY PROCEDURE (OUTPATIENT)
Dept: GENERAL RADIOLOGY | Facility: CLINIC | Age: 83
End: 2023-08-10
Attending: FAMILY MEDICINE
Payer: MEDICARE

## 2023-08-10 VITALS
WEIGHT: 156 LBS | OXYGEN SATURATION: 98 % | DIASTOLIC BLOOD PRESSURE: 60 MMHG | SYSTOLIC BLOOD PRESSURE: 130 MMHG | TEMPERATURE: 97.5 F | HEART RATE: 73 BPM | BODY MASS INDEX: 29.48 KG/M2 | RESPIRATION RATE: 16 BRPM

## 2023-08-10 DIAGNOSIS — M25.551 HIP PAIN, RIGHT: ICD-10-CM

## 2023-08-10 DIAGNOSIS — E78.00 PURE HYPERCHOLESTEROLEMIA: ICD-10-CM

## 2023-08-10 DIAGNOSIS — I10 ESSENTIAL HYPERTENSION, BENIGN: ICD-10-CM

## 2023-08-10 DIAGNOSIS — N18.31 CHRONIC KIDNEY DISEASE, STAGE 3A (H): ICD-10-CM

## 2023-08-10 DIAGNOSIS — M25.551 HIP PAIN, RIGHT: Primary | ICD-10-CM

## 2023-08-10 PROCEDURE — 99214 OFFICE O/P EST MOD 30 MIN: CPT | Performed by: FAMILY MEDICINE

## 2023-08-10 PROCEDURE — 73502 X-RAY EXAM HIP UNI 2-3 VIEWS: CPT | Mod: TC | Performed by: RADIOLOGY

## 2023-08-10 RX ORDER — METHYLPREDNISOLONE 4 MG
TABLET, DOSE PACK ORAL
Qty: 21 TABLET | Refills: 0 | Status: SHIPPED | OUTPATIENT
Start: 2023-08-10 | End: 2024-02-14

## 2023-08-10 ASSESSMENT — PAIN SCALES - GENERAL: PAINLEVEL: NO PAIN (0)

## 2023-08-10 NOTE — PROGRESS NOTES
Assessment/Plan:    Hip pain, right  2 view right hip x-ray to be completed.  Prior ORIF with hardware appearing intact.  Using Tylenol Extra Strength currently.  Question of sciatica etiology.  Will prescribe Medrol Dosepak and reassess if persistent issues.  Consider PT referral for orthopedic referral with Dr. Reina who completed total hip arthroplasty 2019.    Essential hypertension, benign  Blood pressure on recheck 130/60.  Continues Avapro 150 mg daily and hydrochlorothiazide 12.5 mg daily.  Anticipate annual wellness visit November 15, 2023 as scheduled.    Pure hypercholesterolemia  Hypercholesterolemia historically and dietary and exercise modifications to continue for management.    Chronic kidney disease stage IIIa  Prior creatinine 0.95 with GFR 60 on November 10, 2022 and will ensure adequate hydration and avoid NSAIDs as able.        Subjective:    Lidya Dejesus is seen today for right hip pain.  Worse over past couple weeks.  No specific injury or trauma.  Did have hip replacement surgery 2019 on the right side.  Does have osteoporosis.  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  Underlying hypertension treated with Avapro 150 mg daily and hydrochlorothiazide 12.5 mg daily.  Patient has had cholesterol elevation in the past, mild diet controlled otherwise.  Comprehensive review of systems as above otherwise all negative.        1 son -   1 daughter -   Tobacco:  none  EtOH:  not really anymore (prior wine on Friday night with spaghetti and meatballs)  Mom -  after 90...  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)  Hobbies:  paint (porcelain painting)      Past Surgical History:   Procedure Laterality Date    HC  REMOVAL OF TONSILS,<12 Y/O      Description: Tonsillectomy;  Recorded: 10/23/2008;    MASTECTOMY  1990    [place] SHARON HOLE EVAC SUBDUR/EXTRA HEMATOMA      Description: [place] [facility]oles For Evacuation Of Subdural Hematoma;  Recorded: 10/23/2008;    ZZC TOTAL HIP ARTHROPLASTY Right 2/6/2019    Procedure: RIGHT TOTAL HIP ARTHROPLASTY DIRECT ANTERIOR APPROACH;  Surgeon: Luan Gerber MD;  Location: [facility];  Service: Orthopedics        Family History   Problem Relation Age of Onset    Chronic Obstructive Pulmonary Disease Brother     Heart Disease Brother     Crohn's Disease Daughter     Cerebrovascular Disease No family hx of     Diabetes No family hx of     Clotting Disorder No family hx of         Past Medical History:   Diagnosis Date    Cancer (H)     breast    Esophageal reflux     Glaucoma     Hypertension     Osteoporosis         Social History     Tobacco Use    Smoking status: Never    Smokeless tobacco: Never   Vaping Use    Vaping Use: Never used   Substance Use Topics    Alcohol use: No    Drug use: No        Current Outpatient Medications   Medication Sig Dispense Refill    bimatoprost (LUMIGAN) 0.01 % Drop [BIMATOPROST (LUMIGAN) 0.01 % DROP] Administer 1 drop to both eyes at bedtime.             hydrochlorothiazide (MICROZIDE) 12.5 MG capsule Take 1 capsule (12.5 mg) by mouth daily 90 capsule 3    irbesartan (AVAPRO) 150 MG tablet Take 1 tablet (150 mg) by mouth daily 90 tablet 3          Objective:    Vitals:    08/10/23 1423 08/10/23 1424   BP: (!) 150/70 130/60   Pulse: 73    Resp: 16    Temp: 97.5  F (36.4  C)    SpO2: 98%    Weight: 70.8 kg (156 lb)       Body mass index is 29.48 kg/m .    Alert.  Transfers independently.  Right hip pain lateral and posterior lateral aspect nonspecific otherwise no evidence for hip dislocation.  No ecchymoses.  Distal CMS appears intact right lower extremity.      This note has been dictated using voice recognition software and as a result may contain minor  grammatical errors and unintended word substitutions.       Answers submitted by the patient for this visit:  General Questionnaire (Submitted on 8/10/2023)  Chief Complaint: Chronic problems general questions HPI Form  What is the reason for your visit today? : hip pain  How many servings of fruits and vegetables do you eat daily?: 0-1  On average, how many sweetened beverages do you drink each day (Examples: soda, juice, sweet tea, etc.  Do NOT count diet or artificially sweetened beverages)?: 2  How many minutes a day do you exercise enough to make your heart beat faster?: 9 or less  How many days a week do you exercise enough to make your heart beat faster?: 3 or less  How many days per week do you miss taking your medication?: 0

## 2023-12-23 ENCOUNTER — HEALTH MAINTENANCE LETTER (OUTPATIENT)
Age: 83
End: 2023-12-23

## 2024-01-24 DIAGNOSIS — I10 ESSENTIAL (PRIMARY) HYPERTENSION: ICD-10-CM

## 2024-01-25 RX ORDER — HYDROCHLOROTHIAZIDE 12.5 MG/1
1 CAPSULE ORAL DAILY
Qty: 90 CAPSULE | Refills: 0 | Status: SHIPPED | OUTPATIENT
Start: 2024-01-25 | End: 2024-02-14

## 2024-02-14 ENCOUNTER — OFFICE VISIT (OUTPATIENT)
Dept: FAMILY MEDICINE | Facility: CLINIC | Age: 84
End: 2024-02-14
Attending: FAMILY MEDICINE
Payer: MEDICARE

## 2024-02-14 VITALS
WEIGHT: 159.2 LBS | SYSTOLIC BLOOD PRESSURE: 130 MMHG | DIASTOLIC BLOOD PRESSURE: 68 MMHG | HEART RATE: 70 BPM | BODY MASS INDEX: 30.06 KG/M2 | OXYGEN SATURATION: 96 % | TEMPERATURE: 97.6 F | RESPIRATION RATE: 16 BRPM | HEIGHT: 61 IN

## 2024-02-14 DIAGNOSIS — K21.9 GASTROESOPHAGEAL REFLUX DISEASE WITHOUT ESOPHAGITIS: ICD-10-CM

## 2024-02-14 DIAGNOSIS — N18.31 STAGE 3A CHRONIC KIDNEY DISEASE (H): ICD-10-CM

## 2024-02-14 DIAGNOSIS — E78.00 PURE HYPERCHOLESTEROLEMIA: ICD-10-CM

## 2024-02-14 DIAGNOSIS — M81.0 AGE-RELATED OSTEOPOROSIS WITHOUT CURRENT PATHOLOGICAL FRACTURE: ICD-10-CM

## 2024-02-14 DIAGNOSIS — Z78.0 ASYMPTOMATIC POSTMENOPAUSAL STATUS: ICD-10-CM

## 2024-02-14 DIAGNOSIS — Z00.00 MEDICARE ANNUAL WELLNESS VISIT, SUBSEQUENT: Primary | ICD-10-CM

## 2024-02-14 DIAGNOSIS — Z85.3 HISTORY OF BREAST CANCER: ICD-10-CM

## 2024-02-14 DIAGNOSIS — I10 ESSENTIAL HYPERTENSION, BENIGN: ICD-10-CM

## 2024-02-14 LAB — HGB BLD-MCNC: 12.6 G/DL (ref 11.7–15.7)

## 2024-02-14 PROCEDURE — G0439 PPPS, SUBSEQ VISIT: HCPCS | Performed by: FAMILY MEDICINE

## 2024-02-14 PROCEDURE — 80053 COMPREHEN METABOLIC PANEL: CPT | Performed by: FAMILY MEDICINE

## 2024-02-14 PROCEDURE — 83970 ASSAY OF PARATHORMONE: CPT | Performed by: FAMILY MEDICINE

## 2024-02-14 PROCEDURE — 85018 HEMOGLOBIN: CPT | Performed by: FAMILY MEDICINE

## 2024-02-14 PROCEDURE — 99214 OFFICE O/P EST MOD 30 MIN: CPT | Mod: 25 | Performed by: FAMILY MEDICINE

## 2024-02-14 PROCEDURE — 82306 VITAMIN D 25 HYDROXY: CPT | Performed by: FAMILY MEDICINE

## 2024-02-14 PROCEDURE — 82043 UR ALBUMIN QUANTITATIVE: CPT | Performed by: FAMILY MEDICINE

## 2024-02-14 PROCEDURE — 36415 COLL VENOUS BLD VENIPUNCTURE: CPT | Performed by: FAMILY MEDICINE

## 2024-02-14 PROCEDURE — 80061 LIPID PANEL: CPT | Performed by: FAMILY MEDICINE

## 2024-02-14 PROCEDURE — 82570 ASSAY OF URINE CREATININE: CPT | Performed by: FAMILY MEDICINE

## 2024-02-14 RX ORDER — RESPIRATORY SYNCYTIAL VIRUS VACCINE 120MCG/0.5
0.5 KIT INTRAMUSCULAR ONCE
Qty: 1 EACH | Refills: 0 | Status: CANCELLED | OUTPATIENT
Start: 2024-02-14 | End: 2024-02-14

## 2024-02-14 RX ORDER — IRBESARTAN 150 MG/1
150 TABLET ORAL DAILY
Qty: 90 TABLET | Refills: 4 | Status: SHIPPED | OUTPATIENT
Start: 2024-02-14

## 2024-02-14 RX ORDER — HYDROCHLOROTHIAZIDE 12.5 MG/1
1 CAPSULE ORAL DAILY
Qty: 90 CAPSULE | Refills: 4 | Status: SHIPPED | OUTPATIENT
Start: 2024-02-14

## 2024-02-14 SDOH — HEALTH STABILITY: PHYSICAL HEALTH: ON AVERAGE, HOW MANY MINUTES DO YOU ENGAGE IN EXERCISE AT THIS LEVEL?: 150+ MIN

## 2024-02-14 SDOH — HEALTH STABILITY: PHYSICAL HEALTH: ON AVERAGE, HOW MANY DAYS PER WEEK DO YOU ENGAGE IN MODERATE TO STRENUOUS EXERCISE (LIKE A BRISK WALK)?: 4 DAYS

## 2024-02-14 ASSESSMENT — SOCIAL DETERMINANTS OF HEALTH (SDOH): HOW OFTEN DO YOU GET TOGETHER WITH FRIENDS OR RELATIVES?: ONCE A WEEK

## 2024-02-14 ASSESSMENT — PAIN SCALES - GENERAL: PAINLEVEL: NO PAIN (0)

## 2024-02-14 NOTE — PROGRESS NOTES
Preventive Care Visit  [facility]  Jailyn Chawla MD, Family Medicine  Feb 14, 2024    Assessment & Plan     Medicare annual wellness visit, subsequent  At today's visit, we discussed lifestyle interventions to promote self-management and wellness, including maintenance of a healthy weight, healthy diet, regular physical activity and exercise, and falls prevention.  Encouraged completion of Tdap vaccine and Shingrix vaccine series.  Remainder of vaccines up-to-date.  She is no longer interested in mammogram screening.  She is agreeable to bone density screening.  Orders placed.    Stage 3a chronic kidney disease (H)  Discussed importance of adequate hydration, avoidance of nephrotoxic agents.  Will assess renal function today, will assess for microalbuminuria, secondary hyperparathyroidism, iron deficiency, vitamin D deficiency, associated dyslipidemia.  - Albumin Random Urine Quantitative with Creat Ratio; Future  - Lipid panel reflex to direct LDL Non-fasting; Future  - Hemoglobin; Future  - Comprehensive metabolic panel; Future  - Parathyroid Hormone Intact; Future  - Vitamin D Deficiency; Future  - Albumin Random Urine Quantitative with Creat Ratio  - Lipid panel reflex to direct LDL Non-fasting  - Hemoglobin  - Comprehensive metabolic panel  - Parathyroid Hormone Intact  - Vitamin D Deficiency    Benign Essential Hypertension  Stable and controlled on hydrochlorothiazide and irbesartan together.  Continue.  Can give future consideration to attempting to wean hydrochlorothiazide.  Will check renal function.  - hydrochlorothiazide (MICROZIDE) 12.5 MG capsule; Take 1 capsule (12.5 mg) by mouth daily  - irbesartan (AVAPRO) 150 MG tablet; Take 1 tablet (150 mg) by mouth daily  - Comprehensive metabolic panel; Future  - Comprehensive metabolic panel    Hypercholesterolemia  Encouraged healthy lifestyle habits.  She has not required medication for this.  Will check cholesterol and lipids  "today.  - Comprehensive metabolic panel; Future  - Comprehensive metabolic panel    Gastroesophageal reflux disease without esophagitis  She will continue dietary avoidance, discussed that she could try H2 blocker when eating foods that typically trigger reflux, since it is less than weekly she could just take an over-the-counter antacid in response as well.  To let me know if having further problems.  Will try to avoid daily PPI due to reduced renal function.    History of breast cancer  No evidence of recurrence.  She is not interested in further screening or monitoring for breast cancer and declines a breast exam today.    Age-related osteoporosis without current pathological fracture  Asymptomatic postmenopausal status  She is agreeable to follow-up bone density scan.  Will check vitamin D level as well.  Encouraged weightbearing activities, measures to reduce risk of falls.  - DEXA HIP/PELVIS/SPINE - Future; Future  - Vitamin D Deficiency; Future  - Vitamin D Deficiency            BMI  Estimated body mass index is 30.08 kg/m  as calculated from the following:    Height as of this encounter: 1.549 m (5' 1\").    Weight as of this encounter: 72.2 kg (159 lb 3.2 oz).       Counseling  Appropriate preventive services were discussed with this patient, including applicable screening as appropriate for fall prevention, nutrition, physical activity, Tobacco-use cessation, weight loss and cognition.  Checklist reviewing preventive services available has been given to the patient.  Reviewed patient's diet, addressing concerns and/or questions.   The patient was instructed to see the dentist every 6 months.   Discussed possible causes of fatigue. Patient reported safety concerns were addressed today.          Arron Bose is a 83 year old, presenting for the following:  Wellness Visit (Not fasting)          Health Care Directive  Patient does not have a Health Care Directive or Living Will: Discussed advance care " planning with patient; information given to patient to review.    Seen today for routine preventive care visit.  She has no concerns.  Recent difficulties with hip bursitis resolved entirely with Medrol Dosepak, she is pleased with this.  History of reflux, finds that she is having reflux symptoms in the middle of the night perhaps every 2 weeks or so, responds readily to Tums.  Often triggered by spicy foods or tomatoes.  Remains on hydrochlorothiazide and irbesartan and management of hypertension and tolerating well.  Prefers to take them at night as she is not able to get to the bathroom when at work.  She is due for follow-up of chronic kidney disease and high cholesterol.  History of osteoporosis, it has been many years since she has had a bone density scan and she is agreeable to scheduling this.          2/14/2024   General Health   How would you rate your overall physical health? Good         2/14/2024   Nutrition   Diet: Regular (no restrictions)         2/14/2024   Exercise   Days per week of moderate/strenous exercise 4 days   Average minutes spent exercising at this level 150+ min         2/14/2024   Social Factors   Frequency of gathering with friends or relatives Once a week   Worry food won't last until get money to buy more No   Food not last or not have enough money for food? No   Do you have housing?  Yes   Are you worried about losing your housing? No   Lack of transportation? No   Unable to get utilities (heat,electricity)? No         2/14/2024   Fall Risk   Fallen 2 or more times in the past year? No    No   Trouble with walking or balance? No    No          2/14/2024   Activities of Daily Living- Home Safety   Needs help with the following daily activites None of the above   Safety concerns in the home Throw rugs in the hallway    No grab bars in the bathroom         2/14/2024   Dental   Dentist two times every year? (!) NO         2/14/2024   Hearing Screening   Hearing concerns? None of the  above         2/14/2024   Driving Risk Screening   Patient/family members have concerns about driving No         2/14/2024   General Alertness/Fatigue Screening   Have you been more tired than usual lately? (!) YES         2/14/2024   Urinary Incontinence Screening   Bothered by leaking urine in past 6 months No         2/14/2024   TB Screening   Were you born outside of [place]?  No         Today's PHQ-2 Score:       2/14/2024     9:23 AM   PHQ-2 ( 1999 [employer])   Q1: Little interest or pleasure in doing things 0   Q2: Feeling down, depressed or hopeless 0   PHQ-2 Score 0   Q1: Little interest or pleasure in doing things Not at all   Q2: Feeling down, depressed or hopeless Not at all   PHQ-2 Score 0           2/14/2024   Substance Use   Alcohol more than 3/day or more than 7/wk Not Applicable   Do you have a current opioid prescription? No   How severe/bad is pain from 1 to 10? 0/10 (No Pain)   Do you use any other substances recreationally? No     Social History     Tobacco Use    Smoking status: Never    Smokeless tobacco: Never   Vaping Use    Vaping Use: Never used   Substance Use Topics    Alcohol use: No    Drug use: No          Mammogram Screening - Mammography discussed and declined        Reviewed and updated as needed this visit by Provider                    Past Medical History:   Diagnosis Date    Arthritis     Cancer (H)     breast    Cerebral infarction (H) Father    Esophageal reflux     Glaucoma     Hypertension     Osteoporosis      Past Surgical History:   Procedure Laterality Date    BREAST SURGERY      COLONOSCOPY      EYE SURGERY  2022    Cataracts    HC REMOVAL OF TONSILS,<11 Y/O      Description: Tonsillectomy;  Recorded: 10/23/2008;    MASTECTOMY  01/01/1990    OR SHARON HOLE EVAC SUBDUR/EXTRA HEMATOMA      Description: [place] Holes For Evacuation Of Subdural Hematoma;  Recorded: 10/23/2008;    ZZC TOTAL HIP ARTHROPLASTY Right 02/06/2019    Procedure: RIGHT TOTAL HIP ARTHROPLASTY DIRECT ANTERIOR  APPROACH;  Surgeon: Luan Gerber MD;  Location: [facility];  Service: Orthopedics     OB History   No obstetric history on file.     Lab work is in process  Labs reviewed in EPIC  BP Readings from Last 3 Encounters:   02/14/24 130/68   08/10/23 130/60   03/16/23 120/80    Wt Readings from Last 3 Encounters:   02/14/24 72.2 kg (159 lb 3.2 oz)   08/10/23 70.8 kg (156 lb)   03/16/23 72.3 kg (159 lb 6.4 oz)                  Patient Active Problem List   Diagnosis    Hypercholesterolemia    Glaucoma    Benign Essential Hypertension    Esophageal Reflux    Osteoporosis    History of breast cancer    Status post total replacement of right hip    Stage 3a chronic kidney disease (H)     Past Surgical History:   Procedure Laterality Date    BREAST SURGERY      COLONOSCOPY      EYE SURGERY  2022    Cataracts    HC REMOVAL OF TONSILS,<13 Y/O      Description: Tonsillectomy;  Recorded: 10/23/2008;    MASTECTOMY  01/01/1990    SC SHARON HOLE EVAC SUBDUR/EXTRA HEMATOMA      Description: [place] Holes For Evacuation Of Subdural Hematoma;  Recorded: 10/23/2008;    ZZC TOTAL HIP ARTHROPLASTY Right 02/06/2019    Procedure: RIGHT TOTAL HIP ARTHROPLASTY DIRECT ANTERIOR APPROACH;  Surgeon: Luan Gerber MD;  Location: [facility];  Service: Orthopedics       Social History     Tobacco Use    Smoking status: Never    Smokeless tobacco: Never   Substance Use Topics    Alcohol use: No     Family History   Problem Relation Age of Onset    Chronic Obstructive Pulmonary Disease Brother     Heart Disease Brother     Crohn's Disease Daughter     Cerebrovascular Disease No family hx of     Diabetes No family hx of     Clotting Disorder No family hx of          Current Outpatient Medications   Medication Sig Dispense Refill    bimatoprost (LUMIGAN) 0.01 % Drop [BIMATOPROST (LUMIGAN) 0.01 % DROP] Administer 1 drop to both eyes at bedtime.             hydrochlorothiazide (MICROZIDE) 12.5 MG capsule Take 1 capsule (12.5 mg) by  "mouth daily 90 capsule 4    irbesartan (AVAPRO) 150 MG tablet Take 1 tablet (150 mg) by mouth daily 90 tablet 4     Allergies   Allergen Reactions    Ace Inhibitors Cough    Combigan [Brimonidine Tartrate-Timolol] Other (See Comments)     Reddened eyes     Recent Labs   Lab Test 11/10/22  1139 01/27/21  1129 04/11/19  1000   * 120  --    HDL 72 73  --    TRIG 78 74  --    ALT  --  29  --    CR 0.95 1.11* 0.95   GFRESTIMATED 60* 47* 57*   GFRESTBLACK  --  57* >60   POTASSIUM 3.8 4.2 4.2      Current providers sharing in care for this patient include:  Patient Care Team:  Jailyn Chawla MD as PCP - General (Family Practice)  Jailyn Chawla MD as Assigned PCP    The following health maintenance items are reviewed in Epic and correct as of today:  Health Maintenance   Topic Date Due    DEXA  Never done    MICROALBUMIN  Never done    RSV VACCINE (Pregnancy & 60+) (1 - 1-dose 60+ series) Never done    ZOSTER IMMUNIZATION (2 of 3) 12/11/2007    DTAP/TDAP/TD IMMUNIZATION (2 - Td or Tdap) 11/06/2023    MEDICARE ANNUAL WELLNESS VISIT  11/10/2023    BMP  11/10/2023    LIPID  11/10/2023    ANNUAL REVIEW OF HM ORDERS  11/10/2023    HEMOGLOBIN  11/10/2023    FALL RISK ASSESSMENT  02/14/2025    ADVANCE CARE PLANNING  11/10/2027    PHQ-2 (once per calendar year)  Completed    INFLUENZA VACCINE  Completed    Pneumococcal Vaccine: 65+ Years  Completed    URINALYSIS  Completed    COVID-19 Vaccine  Completed    IPV IMMUNIZATION  Aged Out    HPV IMMUNIZATION  Aged Out    MENINGITIS IMMUNIZATION  Aged Out    RSV MONOCLONAL ANTIBODY  Aged Out         Review of Systems  Constitutional, neuro, ENT, endocrine, pulmonary, cardiac, gastrointestinal, genitourinary, musculoskeletal, integument and psychiatric systems are negative, except as otherwise noted.     Objective    Exam  /68   Pulse 70   Temp 97.6  F (36.4  C) (Temporal)   Resp 16   Ht 1.549 m (5' 1\")   Wt 72.2 kg (159 lb 3.2 oz)   LMP  (LMP Unknown)   SpO2 96% " "  BMI 30.08 kg/m     Estimated body mass index is 30.08 kg/m  as calculated from the following:    Height as of this encounter: 1.549 m (5' 1\").    Weight as of this encounter: 72.2 kg (159 lb 3.2 oz).    Physical Exam  GENERAL: alert and no distress  EYES: Eyes grossly normal to inspection, PERRL and conjunctivae and sclerae normal  HENT: ear canals and TM's normal, nose and mouth without ulcers or lesions  NECK: no adenopathy, no asymmetry, masses, or scars  RESP: lungs clear to auscultation - no rales, rhonchi or wheezes  CV: regular rate and rhythm, normal S1 S2, no S3 or S4, no murmur, click or rub, no peripheral edema  ABDOMEN: soft, nontender, no hepatosplenomegaly, no masses and bowel sounds normal  MS: no gross musculoskeletal defects noted, no edema  SKIN: no suspicious lesions or rashes  NEURO: Normal strength and tone, mentation intact and speech normal  PSYCH: mentation appears normal, affect normal/bright  Patient declines breast exam        2/14/2024   Mini Cog   Clock Draw Score 2 Normal   3 Item Recall 3 objects recalled   Mini Cog Total Score 5            Signed Electronically by: Jailyn Chawla MD    "

## 2024-02-14 NOTE — PATIENT INSTRUCTIONS
You are due for tetanus booster.  I recommend that you receive this at the pharmacy.  You could also consider the shingles vaccine.  It is a 2 vaccine series.      Preventive Care Advice   This is general advice given by our system to help you stay healthy. However, your care team may have specific advice just for you. Please talk to your care team about your preventive care needs.  Nutrition  Eat 5 or more servings of fruits and vegetables each day.  Try wheat bread, brown rice and whole grain pasta (instead of white bread, rice, and pasta).  Get enough calcium and vitamin D. Check the label on foods and aim for 100% of the RDA (recommended daily allowance).  Lifestyle  Exercise at least 150 minutes each week  (30 minutes a day, 5 days a week).  Do muscle strengthening activities 2 days a week. These help control your weight and prevent disease.  No smoking.  Wear sunscreen to prevent skin cancer.  Have a dental exam and cleaning every 6 months.  Yearly exams  See your health care team every year to talk about:  Any changes in your health.  Any medicines your care team has prescribed.  Preventive care, family planning, and ways to prevent chronic diseases.  Shots (vaccines)   HPV shots (up to age 26), if you've never had them before.  Hepatitis B shots (up to age 59), if you've never had them before.  COVID-19 shot: Get this shot when it's due.  Flu shot: Get a flu shot every year.  Tetanus shot: Get a tetanus shot every 10 years.  Pneumococcal, hepatitis A, and RSV shots: Ask your care team if you need these based on your risk.  Shingles shot (for age 50 and up)  General health tests  Diabetes screening:  Starting at age 35, Get screened for diabetes at least every 3 years.  If you are younger than age 35, ask your care team if you should be screened for diabetes.  Cholesterol test: At age 39, start having a cholesterol test every 5 years, or more often if advised.  Bone density scan (DEXA): At age 50, ask your  care team if you should have this scan for osteoporosis (brittle bones).  Hepatitis C: Get tested at least once in your life.  STIs (sexually transmitted infections)  Before age 24: Ask your care team if you should be screened for STIs.  After age 24: Get screened for STIs if you're at risk. You are at risk for STIs (including HIV) if:  You are sexually active with more than one person.  You don't use condoms every time.  You or a partner was diagnosed with a sexually transmitted infection.  If you are at risk for HIV, ask about PrEP medicine to prevent HIV.  Get tested for HIV at least once in your life, whether you are at risk for HIV or not.  Cancer screening tests  Cervical cancer screening: If you have a cervix, begin getting regular cervical cancer screening tests starting at age 21.  Breast cancer scan (mammogram): If you've ever had breasts, begin having regular mammograms starting at age 40. This is a scan to check for breast cancer.  Colon cancer screening: It is important to start screening for colon cancer at age 45.  Have a colonoscopy test every 10 years (or more often if you're at risk) Or, ask your provider about stool tests like a FIT test every year or Cologuard test every 3 years.  To learn more about your testing options, visit:   https://www."[employer]/377818.pdf.  For help making a decision, visit:   https://bit.ly/lx28808.  Prostate cancer screening test: If you have a prostate, ask your care team if a prostate cancer screening test (PSA) at age 55 is right for you.  Lung cancer screening: If you are a current or former smoker ages 50 to 80, ask your care team if ongoing lung cancer screenings are right for you.  For informational purposes only. Not to replace the advice of your health care provider. Copyright   2023 [employer]. All rights reserved. Clinically reviewed by the [facility] Transitions Program. [employer] 758679 - REV 01/24.    Learning About Activities of  Daily Living  What are activities of daily living?     Activities of daily living (ADLs) are the basic self-care tasks you do every day. As you age, and if you have health problems, you may find that it's harder to do these things for yourself. That's when you may need some help.  Your doctor uses ADLs to measure how much help you need. Knowing what you can and can't do for yourself is an important first step to getting help. And when you have the help you need, you can stay as independent as possible.  Your doctor will want to know if you are able to do tasks such as:  Take a bath or shower without help.  Go to the bathroom by yourself.  Dress and undress without help.  Shave, comb your hair, and brush teeth on your own.  Get in and out of bed or a chair without help.  Feed yourself without help.  If you are having trouble doing basic self-care tasks, talk with your doctor. You may want to bring a caregiver or family member who can help the doctor understand your needs and abilities.  How will a doctor assess your ADLs?  Asking about ADLs is part of a routine health checkup your doctor will likely do as you age. Your health check might be done in a doctor's office, in your home, or at a hospital. The goal is to find out if you are having any problems that could make your health problems worse or that make it unsafe for you to be on your own.  To measure your ADLs, your doctor will ask how hard it is for you to do routine tasks. He or she may also want to know if you have changed the way you do a task because of a health problem. He or she may watch how you:  Walk back and forth.  Keep your balance while you stand or walk.  Move from sitting to standing or from a bed to a chair.  Button or unbutton a shirt or sweater.  Remove and put on your shoes.  It's normal to feel a little worried or anxious if you find you can't do all the things you used to be able to do. Talking with your doctor about ADLs isn't a test that  you either pass or fail. It's just a way to get more information about your health and safety.  Follow-up care is a key part of your treatment and safety. Be sure to make and go to all appointments, and call your doctor if you are having problems. It's also a good idea to know your test results and keep a list of the medicines you take.  Current as of: February 26, 2023               Content Version: 13.8    7374-1414 [employer].   Care instructions adapted under license by your healthcare professional. If you have questions about a medical condition or this instruction, always ask your healthcare professional. [employer] disclaims any warranty or liability for your use of this information.      Learning About Sleeping Well  What does sleeping well mean?     Sleeping well means getting enough sleep to feel good and stay healthy. How much sleep is enough varies among people.  The number of hours you sleep and how you feel when you wake up are both important. If you do not feel refreshed, you probably need more sleep. Another sign of not getting enough sleep is feeling tired during the day.  Experts recommend that adults get at least 7 or more hours of sleep per day. Children and older adults need more sleep.  Why is getting enough sleep important?  Getting enough quality sleep is a basic part of good health. When your sleep suffers, your physical health, mood, and your thoughts can suffer too. You may find yourself feeling more grumpy or stressed. Not getting enough sleep also can lead to serious problems, including injury, accidents, anxiety, and depression.  What might cause poor sleeping?  Many things can cause sleep problems, including:  Changes to your sleep schedule.  Stress. Stress can be caused by fear about a single event, such as giving a speech. Or you may have ongoing stress, such as worry about work or school.  Depression, anxiety, and other mental or emotional  "conditions.  Changes in your sleep habits or surroundings. This includes changes that happen where you sleep, such as noise, light, or sleeping in a different bed. It also includes changes in your sleep pattern, such as having jet lag or working a late shift.  Health problems, such as pain, breathing problems, and restless legs syndrome.  Lack of regular exercise.  Using alcohol, nicotine, or caffeine before bed.  How can you help yourself?  Here are some tips that may help you sleep more soundly and wake up feeling more refreshed.  Your sleeping area   Use your bedroom only for sleeping and sex. A bit of light reading may help you fall asleep. But if it doesn't, do your reading elsewhere in the house. Try not to use your TV, computer, smartphone, or tablet while you are in bed.  Be sure your bed is big enough to stretch out comfortably, especially if you have a sleep partner.  Keep your bedroom quiet, dark, and cool. Use curtains, blinds, or a sleep mask to block out light. To block out noise, use earplugs, soothing music, or a \"white noise\" machine.  Your evening and bedtime routine   Create a relaxing bedtime routine. You might want to take a warm shower or bath, or listen to soothing music.  Go to bed at the same time every night. And get up at the same time every morning, even if you feel tired.  What to avoid   Limit caffeine (coffee, tea, caffeinated sodas) during the day, and don't have any for at least 6 hours before bedtime.  Avoid drinking alcohol before bedtime. Alcohol can cause you to wake up more often during the night.  Try not to smoke or use tobacco, especially in the evening. Nicotine can keep you awake.  Limit naps during the day, especially close to bedtime.  Avoid lying in bed awake for too long. If you can't fall asleep or if you wake up in the middle of the night and can't get back to sleep within about 20 minutes, get out of bed and go to another room until you feel sleepy.  Avoid taking " "medicine right before bed that may keep you awake or make you feel hyper or energized. Your doctor can tell you if your medicine may do this and if you can take it earlier in the day.  If you can't sleep   Imagine yourself in a peaceful, pleasant scene. Focus on the details and feelings of being in a place that is relaxing.  Get up and do a quiet or boring activity until you feel sleepy.  Avoid drinking any liquids before going to bed to help prevent waking up often to use the bathroom.  Where can you learn more?  Go to https://www.[employer].net/patiented  Enter J942 in the search box to learn more about \"Learning About Sleeping Well.\"  Current as of: July 11, 2023               Content Version: 13.8    2269-9447 [employer].   Care instructions adapted under license by your healthcare professional. If you have questions about a medical condition or this instruction, always ask your healthcare professional. [employer] disclaims any warranty or liability for your use of this information.      "

## 2024-02-15 LAB
ALBUMIN SERPL BCG-MCNC: 4.7 G/DL (ref 3.5–5.2)
ALP SERPL-CCNC: 67 U/L (ref 40–150)
ALT SERPL W P-5'-P-CCNC: 29 U/L (ref 0–50)
ANION GAP SERPL CALCULATED.3IONS-SCNC: 12 MMOL/L (ref 7–15)
AST SERPL W P-5'-P-CCNC: 36 U/L (ref 0–45)
BILIRUB SERPL-MCNC: 0.3 MG/DL
BUN SERPL-MCNC: 22.8 MG/DL (ref 8–23)
CALCIUM SERPL-MCNC: 10.3 MG/DL (ref 8.8–10.2)
CHLORIDE SERPL-SCNC: 99 MMOL/L (ref 98–107)
CHOLEST SERPL-MCNC: 242 MG/DL
CREAT SERPL-MCNC: 1.09 MG/DL (ref 0.51–0.95)
CREAT UR-MCNC: 45.4 MG/DL
DEPRECATED HCO3 PLAS-SCNC: 28 MMOL/L (ref 22–29)
EGFRCR SERPLBLD CKD-EPI 2021: 50 ML/MIN/1.73M2
FASTING STATUS PATIENT QL REPORTED: NO
GLUCOSE SERPL-MCNC: 89 MG/DL (ref 70–99)
HDLC SERPL-MCNC: 66 MG/DL
LDLC SERPL CALC-MCNC: 159 MG/DL
MICROALBUMIN UR-MCNC: 14.9 MG/L
MICROALBUMIN/CREAT UR: 32.82 MG/G CR (ref 0–25)
NONHDLC SERPL-MCNC: 176 MG/DL
POTASSIUM SERPL-SCNC: 3.9 MMOL/L (ref 3.4–5.3)
PROT SERPL-MCNC: 8.2 G/DL (ref 6.4–8.3)
PTH-INTACT SERPL-MCNC: 14 PG/ML (ref 15–65)
SODIUM SERPL-SCNC: 139 MMOL/L (ref 135–145)
TRIGL SERPL-MCNC: 84 MG/DL
VIT D+METAB SERPL-MCNC: 52 NG/ML (ref 20–50)

## 2025-01-15 ENCOUNTER — PATIENT OUTREACH (OUTPATIENT)
Dept: CARE COORDINATION | Facility: CLINIC | Age: 85
End: 2025-01-15
Payer: MEDICARE

## 2025-02-25 ENCOUNTER — OFFICE VISIT (OUTPATIENT)
Dept: FAMILY MEDICINE | Facility: CLINIC | Age: 85
End: 2025-02-25
Attending: FAMILY MEDICINE
Payer: MEDICARE

## 2025-02-25 VITALS
DIASTOLIC BLOOD PRESSURE: 80 MMHG | OXYGEN SATURATION: 97 % | WEIGHT: 160 LBS | TEMPERATURE: 98 F | BODY MASS INDEX: 30.21 KG/M2 | HEART RATE: 70 BPM | RESPIRATION RATE: 16 BRPM | HEIGHT: 61 IN | SYSTOLIC BLOOD PRESSURE: 124 MMHG

## 2025-02-25 DIAGNOSIS — Z00.00 MEDICARE ANNUAL WELLNESS VISIT, SUBSEQUENT: Primary | ICD-10-CM

## 2025-02-25 DIAGNOSIS — M81.0 AGE-RELATED OSTEOPOROSIS WITHOUT CURRENT PATHOLOGICAL FRACTURE: ICD-10-CM

## 2025-02-25 DIAGNOSIS — K21.9 GASTROESOPHAGEAL REFLUX DISEASE WITHOUT ESOPHAGITIS: ICD-10-CM

## 2025-02-25 DIAGNOSIS — G89.29 CHRONIC LEFT-SIDED LOW BACK PAIN WITHOUT SCIATICA: ICD-10-CM

## 2025-02-25 DIAGNOSIS — Z85.3 HISTORY OF BREAST CANCER: ICD-10-CM

## 2025-02-25 DIAGNOSIS — N18.31 STAGE 3A CHRONIC KIDNEY DISEASE (H): ICD-10-CM

## 2025-02-25 DIAGNOSIS — Z23 NEED FOR TDAP VACCINATION: ICD-10-CM

## 2025-02-25 DIAGNOSIS — E78.00 PURE HYPERCHOLESTEROLEMIA: ICD-10-CM

## 2025-02-25 DIAGNOSIS — I10 ESSENTIAL HYPERTENSION, BENIGN: ICD-10-CM

## 2025-02-25 DIAGNOSIS — M54.50 CHRONIC LEFT-SIDED LOW BACK PAIN WITHOUT SCIATICA: ICD-10-CM

## 2025-02-25 LAB
ALBUMIN SERPL BCG-MCNC: 4.4 G/DL (ref 3.5–5.2)
ALP SERPL-CCNC: 66 U/L (ref 40–150)
ALT SERPL W P-5'-P-CCNC: 23 U/L (ref 0–50)
ANION GAP SERPL CALCULATED.3IONS-SCNC: 13 MMOL/L (ref 7–15)
AST SERPL W P-5'-P-CCNC: 36 U/L (ref 0–45)
BILIRUB SERPL-MCNC: 0.3 MG/DL
BUN SERPL-MCNC: 18 MG/DL (ref 8–23)
CALCIUM SERPL-MCNC: 9.9 MG/DL (ref 8.8–10.4)
CHLORIDE SERPL-SCNC: 99 MMOL/L (ref 98–107)
CHOLEST SERPL-MCNC: 230 MG/DL
CREAT SERPL-MCNC: 0.99 MG/DL (ref 0.51–0.95)
CREAT UR-MCNC: 122 MG/DL
EGFRCR SERPLBLD CKD-EPI 2021: 56 ML/MIN/1.73M2
ERYTHROCYTE [DISTWIDTH] IN BLOOD BY AUTOMATED COUNT: 12.9 % (ref 10–15)
FASTING STATUS PATIENT QL REPORTED: NO
FASTING STATUS PATIENT QL REPORTED: NO
GLUCOSE SERPL-MCNC: 93 MG/DL (ref 70–99)
HCO3 SERPL-SCNC: 26 MMOL/L (ref 22–29)
HCT VFR BLD AUTO: 37.3 % (ref 35–47)
HDLC SERPL-MCNC: 49 MG/DL
HGB BLD-MCNC: 12.3 G/DL (ref 11.7–15.7)
LDLC SERPL CALC-MCNC: 148 MG/DL
MCH RBC QN AUTO: 29.6 PG (ref 26.5–33)
MCHC RBC AUTO-ENTMCNC: 33 G/DL (ref 31.5–36.5)
MCV RBC AUTO: 90 FL (ref 78–100)
MICROALBUMIN UR-MCNC: 25.3 MG/L
MICROALBUMIN/CREAT UR: 20.74 MG/G CR (ref 0–25)
NONHDLC SERPL-MCNC: 181 MG/DL
PLATELET # BLD AUTO: 300 10E3/UL (ref 150–450)
POTASSIUM SERPL-SCNC: 3.9 MMOL/L (ref 3.4–5.3)
PROT SERPL-MCNC: 7.9 G/DL (ref 6.4–8.3)
PTH-INTACT SERPL-MCNC: 17 PG/ML (ref 15–65)
RBC # BLD AUTO: 4.15 10E6/UL (ref 3.8–5.2)
SODIUM SERPL-SCNC: 138 MMOL/L (ref 135–145)
TRIGL SERPL-MCNC: 167 MG/DL
VIT D+METAB SERPL-MCNC: 45 NG/ML (ref 20–50)
WBC # BLD AUTO: 6.7 10E3/UL (ref 4–11)

## 2025-02-25 PROCEDURE — 99214 OFFICE O/P EST MOD 30 MIN: CPT | Mod: 25 | Performed by: FAMILY MEDICINE

## 2025-02-25 PROCEDURE — G0439 PPPS, SUBSEQ VISIT: HCPCS | Performed by: FAMILY MEDICINE

## 2025-02-25 PROCEDURE — 80061 LIPID PANEL: CPT | Performed by: FAMILY MEDICINE

## 2025-02-25 PROCEDURE — 83970 ASSAY OF PARATHORMONE: CPT | Performed by: FAMILY MEDICINE

## 2025-02-25 PROCEDURE — 85027 COMPLETE CBC AUTOMATED: CPT | Performed by: FAMILY MEDICINE

## 2025-02-25 PROCEDURE — G2211 COMPLEX E/M VISIT ADD ON: HCPCS | Performed by: FAMILY MEDICINE

## 2025-02-25 PROCEDURE — 80053 COMPREHEN METABOLIC PANEL: CPT | Performed by: FAMILY MEDICINE

## 2025-02-25 PROCEDURE — 3074F SYST BP LT 130 MM HG: CPT | Performed by: FAMILY MEDICINE

## 2025-02-25 PROCEDURE — 82043 UR ALBUMIN QUANTITATIVE: CPT | Performed by: FAMILY MEDICINE

## 2025-02-25 PROCEDURE — 1126F AMNT PAIN NOTED NONE PRSNT: CPT | Performed by: FAMILY MEDICINE

## 2025-02-25 PROCEDURE — 82306 VITAMIN D 25 HYDROXY: CPT | Performed by: FAMILY MEDICINE

## 2025-02-25 PROCEDURE — 82570 ASSAY OF URINE CREATININE: CPT | Performed by: FAMILY MEDICINE

## 2025-02-25 PROCEDURE — 36415 COLL VENOUS BLD VENIPUNCTURE: CPT | Performed by: FAMILY MEDICINE

## 2025-02-25 PROCEDURE — 3079F DIAST BP 80-89 MM HG: CPT | Performed by: FAMILY MEDICINE

## 2025-02-25 RX ORDER — FAMOTIDINE 20 MG/1
20 TABLET, FILM COATED ORAL
COMMUNITY

## 2025-02-25 RX ORDER — IRBESARTAN 150 MG/1
150 TABLET ORAL DAILY
Qty: 90 TABLET | Refills: 4 | Status: SHIPPED | OUTPATIENT
Start: 2025-02-25

## 2025-02-25 RX ORDER — AMOXICILLIN 500 MG/1
CAPSULE ORAL
COMMUNITY
Start: 2024-10-21

## 2025-02-25 RX ORDER — HYDROCHLOROTHIAZIDE 12.5 MG/1
1 CAPSULE ORAL DAILY
Qty: 90 CAPSULE | Refills: 4 | Status: SHIPPED | OUTPATIENT
Start: 2025-02-25

## 2025-02-25 RX ORDER — LATANOPROST 50 UG/ML
SOLUTION/ DROPS OPHTHALMIC
COMMUNITY
Start: 2025-01-28

## 2025-02-25 SDOH — HEALTH STABILITY: PHYSICAL HEALTH: ON AVERAGE, HOW MANY DAYS PER WEEK DO YOU ENGAGE IN MODERATE TO STRENUOUS EXERCISE (LIKE A BRISK WALK)?: 4 DAYS

## 2025-02-25 SDOH — HEALTH STABILITY: PHYSICAL HEALTH: ON AVERAGE, HOW MANY MINUTES DO YOU ENGAGE IN EXERCISE AT THIS LEVEL?: 40 MIN

## 2025-02-25 ASSESSMENT — SOCIAL DETERMINANTS OF HEALTH (SDOH): HOW OFTEN DO YOU GET TOGETHER WITH FRIENDS OR RELATIVES?: TWICE A WEEK

## 2025-02-25 ASSESSMENT — PAIN SCALES - GENERAL: PAINLEVEL_OUTOF10: NO PAIN (0)

## 2025-02-25 NOTE — PROGRESS NOTES
Preventive Care Visit  [facility]  Jailyn Chawla MD, Family Medicine  Feb 25, 2025      Assessment & Plan     Medicare annual wellness visit, subsequent  At today's visit, we discussed lifestyle interventions to promote self-management and wellness, including maintenance of a healthy weight, healthy diet, regular physical activity and exercise, and falls prevention.  Advised Tdap, this appears best covered in the pharmacy, she will obtain at pharmacy.  Remainder vaccines up-to-date.  Discussed bone density follow-up, she declines.  Will screen for dyslipidemia with nonfasting lipids today.    Need for Tdap vaccination  Order placed to have this completed at the pharmacy.  - Tdap, tetanus-diptheria-acell pertussis, (BOOSTRIX) 5-2.5-18.5 LF-MCG/0.5 DONOVAN injection; Inject 0.5 mLs into the muscle once for 1 dose.    Stage 3a chronic kidney disease (H)  Continue to avoid nephrotoxic agents, maintain adequate hydration, continue irbesartan.  Discussed potential to attempt reducing or discontinuing hydrochlorothiazide, for now we elected not to make any changes.  Will check kidney function, electrolytes, CBC, vitamin D level for sufficiency, hyperparathyroidism evaluation.  - Albumin Random Urine Quantitative with Creat Ratio; Future  - Hemoglobin; Future  - Comprehensive metabolic panel; Future  - Lipid panel reflex to direct LDL Non-fasting; Future  - CBC with platelets; Future  - Vitamin D Deficiency; Future  - Parathyroid Hormone Intact; Future  - Albumin Random Urine Quantitative with Creat Ratio  - Comprehensive metabolic panel  - Lipid panel reflex to direct LDL Non-fasting  - CBC with platelets  - Vitamin D Deficiency  - Parathyroid Hormone Intact    Benign Essential Hypertension  Encouraged healthy lifestyle habits.  Continue patellar tan and hydrochlorothiazide at current doses.  Could give consideration reduction of hydrochlorothiazide in the future but she declines today.  Will check  renal function and electrolytes.  - hydrochlorothiazide (MICROZIDE) 12.5 MG capsule; Take 1 capsule (12.5 mg) by mouth daily.  - irbesartan (AVAPRO) 150 MG tablet; Take 1 tablet (150 mg) by mouth daily.  - Comprehensive metabolic panel; Future  - Comprehensive metabolic panel    Hypercholesterolemia  Encouraged healthy lifestyle habits.  We will check nonfasting lipids, liver function today.  - Comprehensive metabolic panel; Future  - Lipid panel reflex to direct LDL Non-fasting; Future  - Comprehensive metabolic panel  - Lipid panel reflex to direct LDL Non-fasting    Age-related osteoporosis without current pathological fracture  Will check vitamin D level.  She is not interested in further evaluation or treatment of bone density and declines DEXA screening.  Advised physical therapy to work on strengthening and balance to reduce risk of falls.    History of breast cancer  Will check CBC and comprehensive metabolic panel today.    Gastroesophageal reflux disease without esophagitis  Stable and controlled with famotidine at bedtime and dietary changes.  Continue.    Chronic left-sided low back pain without sciatica  Muscular low back pain versus SI joint dysfunction.  No midline spinal tenderness or bony tenderness, no indication for imaging.  This is contributing to some gait instability, balance changes.  Discussed options.  Advised consideration of yoga, core strengthening, order placed for physical therapy.  - Physical Therapy  Referral; Future    We discussed potential follow-up colonoscopy given multiple prior tubular adenomas with follow-up recommended in 6 months back in 2017 when she last had her colonoscopy, she is going to consider this but declines referral today.        The longitudinal plan of care for the diagnosis(es)/condition(s) as documented were addressed during this visit. Due to the added complexity in care, I will continue to support Omer in the subsequent management and with ongoing  "continuity of care.    BMI  Estimated body mass index is 30.23 kg/m  as calculated from the following:    Height as of this encounter: 1.549 m (5' 1\").    Weight as of this encounter: 72.6 kg (160 lb).       Counseling  Appropriate preventive services were addressed with this patient via screening, questionnaire, or discussion as appropriate for fall prevention, nutrition, physical activity, Tobacco-use cessation, social engagement, weight loss and cognition.  Checklist reviewing preventive services available has been given to the patient.  Reviewed patient's diet, addressing concerns and/or questions.   The patient was instructed to see the dentist every 6 months.   Discussed possible causes of fatigue. Patient reported safety concerns were addressed today.        Arron Tello is a 84 year old, presenting for the following:  Wellness Visit (fasting), Colonoscopy (Wanting to go Dr [place]edo, unsure of group name), and Stiffness (Exercise she can do to help with stiffness)            HPI  Seen in clinic today for annual wellness visit.  She has been a little bit more stiff, notes that at work she is needing to be standing bent forward at the waist and then will be very stiff after, doing some range of motion exercises of her low back briefly after is very helpful.  Has had some more persistent pain in her left low back without radiation.  Reflux has been stable on famotidine at bedtime.  History of chronic kidney disease stage IIIa due for follow-up, taking irbesartan to support this, also hydrochlorothiazide to help manage hypertension.  Denies lightheadedness, dizziness, headaches, chest pain, dyspnea, or swelling.  History of dyslipidemia has not required treatment.  History of breast cancer without evidence of recurrence.  History of osteoporosis, she has not been interested in interventions, treatment, or monitoring.  In general has been a little more tired of late, nonspecific.        Health Care " Directive  Patient does not have a Health Care Directive: Discussed advance care planning with patient; information given to patient to review.      2/25/2025   General Health   How would you rate your overall physical health? Good   Feel stress (tense, anxious, or unable to sleep) Only a little   (!) STRESS CONCERN      2/25/2025   Nutrition   Diet: Regular (no restrictions)         2/25/2025   Exercise   Days per week of moderate/strenous exercise 4 days   Average minutes spent exercising at this level 40 min         2/25/2025   Social Factors   Frequency of gathering with friends or relatives Twice a week   Worry food won't last until get money to buy more No   Food not last or not have enough money for food? No   Do you have housing? (Housing is defined as stable permanent housing and does not include staying ouside in a car, in a tent, in an abandoned building, in an overnight shelter, or couch-surfing.) Yes   Are you worried about losing your housing? No   Lack of transportation? No   Unable to get utilities (heat,electricity)? No         2/25/2025   Fall Risk   Fallen 2 or more times in the past year? No   Trouble with walking or balance? Yes   Gait Speed Test (Document in seconds) 4.5   Gait Speed Test Interpretation Less than or equal to 5.00 seconds - PASS          2/25/2025   Activities of Daily Living- Home Safety   Needs help with the following daily activites None of the above   Safety concerns in the home Throw rugs in the hallway    No grab bars in the bathroom       Multiple values from one day are sorted in reverse-chronological order         2/25/2025   Dental   Dentist two times every year? (!) NO         2/25/2025   Hearing Screening   Hearing concerns? None of the above         2/25/2025   Driving Risk Screening   Patient/family members have concerns about driving No         2/25/2025   General Alertness/Fatigue Screening   Have you been more tired than usual lately? (!) YES         2/25/2025    Urinary Incontinence Screening   Bothered by leaking urine in past 6 months No         2/14/2024   TB Screening   Were you born outside of the [place]? No         Today's PHQ-2 Score:       2/25/2025    10:08 AM   PHQ-2 ( 1999 [employer])   Q1: Little interest or pleasure in doing things 0   Q2: Feeling down, depressed or hopeless 1   PHQ-2 Score 1    Q1: Little interest or pleasure in doing things Not at all   Q2: Feeling down, depressed or hopeless Several days   PHQ-2 Score 1       Patient-reported           2/25/2025   Substance Use   Alcohol more than 3/day or more than 7/wk Not Applicable   Do you have a current opioid prescription? No   How severe/bad is pain from 1 to 10? 2/10   Do you use any other substances recreationally? No     Social History     Tobacco Use    Smoking status: Never    Smokeless tobacco: Never   Vaping Use    Vaping status: Never Used   Substance Use Topics    Alcohol use: No    Drug use: No          Mammogram Screening - Mammography discussed and declined              Reviewed and updated as needed this visit by Provider                    Past Medical History:   Diagnosis Date    Arthritis     Cancer (H)     breast    Cerebral infarction (H) Father    Esophageal reflux     Glaucoma     Hypertension     Osteoporosis      Past Surgical History:   Procedure Laterality Date    BREAST SURGERY      COLONOSCOPY      EYE SURGERY  2022    Cataracts    HC REMOVAL OF TONSILS,<13 Y/O      Description: Tonsillectomy;  Recorded: 10/23/2008;    MASTECTOMY  01/01/1990    [place] SHARON HOLE EVAC SUBDUR/EXTRA HEMATOMA      Description: [place] [facility] For Evacuation Of Subdural Hematoma;  Recorded: 10/23/2008;    ZZC TOTAL HIP ARTHROPLASTY Right 02/06/2019    Procedure: RIGHT TOTAL HIP ARTHROPLASTY DIRECT ANTERIOR APPROACH;  Surgeon: Luan Gerber MD;  Location: [facility];  Service: Orthopedics     OB History   No obstetric history on file.     Lab work is in process  Labs reviewed in EPIC  BP Readings from  Last 3 Encounters:   02/25/25 124/80   02/14/24 130/68   08/10/23 130/60    Wt Readings from Last 3 Encounters:   02/25/25 72.6 kg (160 lb)   02/14/24 72.2 kg (159 lb 3.2 oz)   08/10/23 70.8 kg (156 lb)                  Patient Active Problem List   Diagnosis    Hypercholesterolemia    Glaucoma    Benign Essential Hypertension    Esophageal Reflux    Osteoporosis    History of breast cancer    Status post total replacement of right hip    Stage 3a chronic kidney disease (H)     Past Surgical History:   Procedure Laterality Date    BREAST SURGERY      COLONOSCOPY      EYE SURGERY  2022    Cataracts    HC REMOVAL OF TONSILS,<13 Y/O      Description: Tonsillectomy;  Recorded: 10/23/2008;    MASTECTOMY  01/01/1990    [place] SHARON HOLE EVAC SUBDUR/EXTRA HEMATOMA      Description: [place] [facility] For Evacuation Of Subdural Hematoma;  Recorded: 10/23/2008;    ZZC TOTAL HIP ARTHROPLASTY Right 02/06/2019    Procedure: RIGHT TOTAL HIP ARTHROPLASTY DIRECT ANTERIOR APPROACH;  Surgeon: Luan Gerber MD;  Location: [facility] OR;  Service: Orthopedics       Social History     Tobacco Use    Smoking status: Never    Smokeless tobacco: Never   Substance Use Topics    Alcohol use: No     Family History   Problem Relation Age of Onset    Chronic Obstructive Pulmonary Disease Brother     Heart Disease Brother     Crohn's Disease Daughter     Cerebrovascular Disease No family hx of     Diabetes No family hx of     Clotting Disorder No family hx of          Current Outpatient Medications   Medication Sig Dispense Refill    amoxicillin (AMOXIL) 500 MG capsule TAKE 4 CAPSULES BY MOUTH 1 HOUR PRIOR TO DENTAL APPT      famotidine (PEPCID) 20 MG tablet Take 20 mg by mouth nightly as needed.      hydrochlorothiazide (MICROZIDE) 12.5 MG capsule Take 1 capsule (12.5 mg) by mouth daily. 90 capsule 4    irbesartan (AVAPRO) 150 MG tablet Take 1 tablet (150 mg) by mouth daily. 90 tablet 4    latanoprost (XALATAN) 0.005 % ophthalmic solution INSTILL  "1 DROP INTO BOTH EYES EVERY EVENING      Tdap, tetanus-diptheria-acell pertussis, (BOOSTRIX) 5-2.5-18.5 LF-MCG/0.5 DONOVAN injection Inject 0.5 mLs into the muscle once for 1 dose. 0.5 mL 0     Allergies   Allergen Reactions    Ace Inhibitors Cough    Combigan [Brimonidine Tartrate-Timolol] Other (See Comments)     Reddened eyes     Recent Labs   Lab Test 02/14/24  1600 11/10/22  1139 01/27/21  1129 04/11/19  1000   * 139* 120  --    HDL 66 72 73  --    TRIG 84 78 74  --    ALT 29  --  29  --    CR 1.09* 0.95 1.11* 0.95   GFRESTIMATED 50* 60* 47* 57*   GFRESTBLACK  --   --  57* >60   POTASSIUM 3.9 3.8 4.2 4.2      Current providers sharing in care for this patient include:  Patient Care Team:  Jailyn Chawla MD as PCP - General (Family Practice)  Jailyn Chawla MD as Assigned PCP    The following health maintenance items are reviewed in Epic and correct as of today:  Health Maintenance   Topic Date Due    DEXA  Never done    DTAP/TDAP/TD IMMUNIZATION (2 - Td or Tdap) 11/06/2023    BMP  02/14/2025    LIPID  02/14/2025    MICROALBUMIN  02/14/2025    ANNUAL REVIEW OF HM ORDERS  02/14/2025    MEDICARE ANNUAL WELLNESS VISIT  02/14/2025    HEMOGLOBIN  02/14/2025    COVID-19 Vaccine (9 - 2024-25 season) 03/02/2025    FALL RISK ASSESSMENT  02/25/2026    ADVANCE CARE PLANNING  02/14/2029    PHQ-2 (once per calendar year)  Completed    INFLUENZA VACCINE  Completed    Pneumococcal Vaccine: 50+ Years  Completed    URINALYSIS  Completed    ZOSTER IMMUNIZATION  Completed    RSV VACCINE  Completed    HPV IMMUNIZATION  Aged Out    MENINGITIS IMMUNIZATION  Aged Out         Review of Systems  Constitutional, neuro, ENT, endocrine, pulmonary, cardiac, gastrointestinal, genitourinary, musculoskeletal, integument and psychiatric systems are negative, except as otherwise noted.     Objective    Exam  /80   Pulse 70   Temp 98  F (36.7  C) (Oral)   Resp 16   Ht 1.549 m (5' 1\")   Wt 72.6 kg (160 lb)   LMP  (LMP " "Unknown)   SpO2 97%   BMI 30.23 kg/m     Estimated body mass index is 30.23 kg/m  as calculated from the following:    Height as of this encounter: 1.549 m (5' 1\").    Weight as of this encounter: 72.6 kg (160 lb).    Physical Exam  GENERAL: alert and no distress  EYES: Eyes grossly normal to inspection, PERRL and conjunctivae and sclerae normal  HENT: ear canals and TM's normal, nose and mouth without ulcers or lesions  NECK: no adenopathy, no asymmetry, masses, or scars  RESP: lungs clear to auscultation - no rales, rhonchi or wheezes  CV: regular rate and rhythm, normal S1 S2, no S3 or S4, no murmur, click or rub, no peripheral edema  ABDOMEN: soft, nontender, no hepatosplenomegaly, no masses and bowel sounds normal  MS: no gross musculoskeletal defects noted, no edema  SKIN: no suspicious lesions or rashes  NEURO: Normal strength and tone, mentation intact and speech normal  PSYCH: mentation appears normal, affect normal/bright        2/25/2025   Mini Cog   Clock Draw Score 2 Normal   3 Item Recall 3 objects recalled   Mini Cog Total Score 5              Signed Electronically by: Jailyn Chawla MD    "

## 2025-02-25 NOTE — PATIENT INSTRUCTIONS
Patient Education   Preventive Care Advice   This is general advice given by our system to help you stay healthy. However, your care team may have specific advice just for you. Please talk to your care team about your preventive care needs.  Nutrition  Eat 5 or more servings of fruits and vegetables each day.  Try wheat bread, brown rice and whole grain pasta (instead of white bread, rice, and pasta).  Get enough calcium and vitamin D. Check the label on foods and aim for 100% of the RDA (recommended daily allowance).  Lifestyle  Exercise at least 150 minutes each week  (30 minutes a day, 5 days a week).  Do muscle strengthening activities 2 days a week. These help control your weight and prevent disease.  No smoking.  Wear sunscreen to prevent skin cancer.  Have a dental exam and cleaning every 6 months.  Yearly exams  See your health care team every year to talk about:  Any changes in your health.  Any medicines your care team has prescribed.  Preventive care, family planning, and ways to prevent chronic diseases.  Shots (vaccines)   HPV shots (up to age 26), if you've never had them before.  Hepatitis B shots (up to age 59), if you've never had them before.  COVID-19 shot: Get this shot when it's due.  Flu shot: Get a flu shot every year.  Tetanus shot: Get a tetanus shot every 10 years.  Pneumococcal, hepatitis A, and RSV shots: Ask your care team if you need these based on your risk.  Shingles shot (for age 50 and up)  General health tests  Diabetes screening:  Starting at age 35, Get screened for diabetes at least every 3 years.  If you are younger than age 35, ask your care team if you should be screened for diabetes.  Cholesterol test: At age 39, start having a cholesterol test every 5 years, or more often if advised.  Bone density scan (DEXA): At age 50, ask your care team if you should have this scan for osteoporosis (brittle bones).  Hepatitis C: Get tested at least once in your life.  STIs (sexually  transmitted infections)  Before age 24: Ask your care team if you should be screened for STIs.  After age 24: Get screened for STIs if you're at risk. You are at risk for STIs (including HIV) if:  You are sexually active with more than one person.  You don't use condoms every time.  You or a partner was diagnosed with a sexually transmitted infection.  If you are at risk for HIV, ask about PrEP medicine to prevent HIV.  Get tested for HIV at least once in your life, whether you are at risk for HIV or not.  Cancer screening tests  Cervical cancer screening: If you have a cervix, begin getting regular cervical cancer screening tests starting at age 21.  Breast cancer scan (mammogram): If you've ever had breasts, begin having regular mammograms starting at age 40. This is a scan to check for breast cancer.  Colon cancer screening: It is important to start screening for colon cancer at age 45.  Have a colonoscopy test every 10 years (or more often if you're at risk) Or, ask your provider about stool tests like a FIT test every year or Cologuard test every 3 years.  To learn more about your testing options, visit:   .  For help making a decision, visit:   https://bit.ly/ls32225.  Prostate cancer screening test: If you have a prostate, ask your care team if a prostate cancer screening test (PSA) at age 55 is right for you.  Lung cancer screening: If you are a current or former smoker ages 50 to 80, ask your care team if ongoing lung cancer screenings are right for you.  For informational purposes only. Not to replace the advice of your health care provider. Copyright   2023 [facility]. All rights reserved. Clinically reviewed by the [facility]itions [facility]. [employer] 351789 - REV 01/24.  Learning About Activities of Daily Living  What are activities of daily living?     Activities of daily living (ADLs) are the basic self-care tasks you do every day. These include eating, bathing, dressing,  and moving around.  As you age, and if you have health problems, you may find that it's harder to do some of these tasks. If so, your doctor can suggest ideas that may help.  To measure what kind of help you may need, your doctor will ask how well you are able to do ADLs. Let your doctor know if there are any tasks that you are having trouble doing. This is an important first step to getting help. And when you have the help you need, you can stay as independent as possible.  How will a doctor assess your ADLs?  Asking about ADLs is part of a routine health checkup your doctor will likely do as you age. Your health check might be done in a doctor's office, in your home, or at a hospital. The goal is to find out if you are having any problems that could make it hard to care for yourself or that make it unsafe for you to be on your own.  To measure your ADLs, your doctor will ask how hard it is for you to do routine tasks. Your doctor may also want to know if you have changed the way you do a task because of a health problem. Your doctor may watch how you:  Walk back and forth.  Keep your balance while you stand or walk.  Move from sitting to standing or from a bed to a chair.  Button or unbutton a shirt or sweater.  Remove and put on your shoes.  It's common to feel a little worried or anxious if you find you can't do all the things you used to be able to do. Talking with your doctor about ADLs is a way to make sure you're as safe as possible and able to care for yourself as well as you can. You may want to bring a caregiver, friend, or family member to your checkup. They can help you talk to your doctor.  Follow-up care is a key part of your treatment and safety. Be sure to make and go to all appointments, and call your doctor if you are having problems. It's also a good idea to know your test results and keep a list of the medicines you take.  Current as of: October 24, 2023  Content Version: 14.3    2024 [facility]  [employer].   Care instructions adapted under license by your healthcare professional. If you have questions about a medical condition or this instruction, always ask your healthcare professional. [facility] [employer] disclaims any warranty or liability for your use of this information.    Preventing Falls: Care Instructions  Injuries and health problems such as trouble walking or poor eyesight can increase your risk of falling. So can some medicines. But there are things you can do to help prevent falls. You can exercise to get stronger. You can also arrange your home to make it safer.    Talk to your doctor about the medicines you take. Ask if any of them increase the risk of falls and whether they can be changed or stopped.   Try to exercise regularly. It can help improve your strength and balance. This can help lower your risk of falling.         Practice fall safety and prevention.   Wear low-heeled shoes that fit well and give your feet good support. Talk to your doctor if you have foot problems that make this hard.  Carry a cellphone or wear a medical alert device that you can use to call for help.  Use stepladders instead of chairs to reach high objects. Don't climb if you're at risk for falls. Ask for help, if needed.  Wear the correct eyeglasses, if you need them.        Make your home safer.   Remove rugs, cords, clutter, and furniture from walkways.  Keep your house well lit. Use night-lights in hallways and bathrooms.  Install and use sturdy handrails on stairways.  Wear nonskid footwear, even inside. Don't walk barefoot or in socks without shoes.        Be safe outside.   Use handrails, curb cuts, and ramps whenever possible.  Keep your hands free by using a shoulder bag or backpack.  Try to walk in well-lit areas. Watch out for uneven ground, changes in pavement, and debris.  Be careful in the winter. Walk on the grass or gravel when sidewalks are slippery. Use de-icer on steps and walkways.  "Add non-slip devices to shoes.    Put grab bars and nonskid mats in your shower or tub and near the toilet. Try to use a shower chair or bath bench when bathing.   Get into a tub or shower by putting in your weaker leg first. Get out with your strong side first. Have a phone or medical alert device in the bathroom with you.   Where can you learn more?  Go to https://www.[employer]/patiented  Enter G117 in the search box to learn more about \"Preventing Falls: Care Instructions.\"  Current as of: July 31, 2024  Content Version: 14.3    2024 [employer].   Care instructions adapted under license by your healthcare professional. If you have questions about a medical condition or this instruction, always ask your healthcare professional. [employer] disclaims any warranty or liability for your use of this information.    Learning About Sleeping Well  What does sleeping well mean?     Sleeping well means getting enough sleep to feel good and stay healthy. How much sleep is enough varies among people.  The number of hours you sleep and how you feel when you wake up are both important. If you do not feel refreshed, you probably need more sleep. Another sign of not getting enough sleep is feeling tired during the day.  Experts recommend that adults get at least 7 or more hours of sleep per day. Children and older adults need more sleep.  Why is getting enough sleep important?  Getting enough quality sleep is a basic part of good health. When your sleep suffers, your physical health, mood, and your thoughts can suffer too. You may find yourself feeling more grumpy or stressed. Not getting enough sleep also can lead to serious problems, including injury, accidents, anxiety, and depression.  What might cause poor sleeping?  Many things can cause sleep problems, including:  Changes to your sleep schedule.  Stress. Stress can be caused by fear about a single event, such as giving a speech. Or you may have " "ongoing stress, such as worry about work or school.  Depression, anxiety, and other mental or emotional conditions.  Changes in your sleep habits or surroundings. This includes changes that happen where you sleep, such as noise, light, or sleeping in a different bed. It also includes changes in your sleep pattern, such as having jet lag or working a late shift.  Health problems, such as pain, breathing problems, and restless legs syndrome.  Lack of regular exercise.  Using alcohol, nicotine, or caffeine before bed.  How can you help yourself?  Here are some tips that may help you sleep more soundly and wake up feeling more refreshed.  Your sleeping area   Use your bedroom only for sleeping and sex. A bit of light reading may help you fall asleep. But if it doesn't, do your reading elsewhere in the house. Try not to use your TV, computer, smartphone, or tablet while you are in bed.  Be sure your bed is big enough to stretch out comfortably, especially if you have a sleep partner.  Keep your bedroom quiet, dark, and cool. Use curtains, blinds, or a sleep mask to block out light. To block out noise, use earplugs, soothing music, or a \"white noise\" machine.  Your evening and bedtime routine   Create a relaxing bedtime routine. You might want to take a warm shower or bath, or listen to soothing music.  Go to bed at the same time every night. And get up at the same time every morning, even if you feel tired.  What to avoid   Limit caffeine (coffee, tea, caffeinated sodas) during the day, and don't have any for at least 6 hours before bedtime.  Avoid drinking alcohol before bedtime. Alcohol can cause you to wake up more often during the night.  Try not to smoke or use tobacco, especially in the evening. Nicotine can keep you awake.  Limit naps during the day, especially close to bedtime.  Avoid lying in bed awake for too long. If you can't fall asleep or if you wake up in the middle of the night and can't get back to sleep " "within about 20 minutes, get out of bed and go to another room until you feel sleepy.  Avoid taking medicine right before bed that may keep you awake or make you feel hyper or energized. Your doctor can tell you if your medicine may do this and if you can take it earlier in the day.  If you can't sleep   Imagine yourself in a peaceful, pleasant scene. Focus on the details and feelings of being in a place that is relaxing.  Get up and do a quiet or boring activity until you feel sleepy.  Avoid drinking any liquids before going to bed to help prevent waking up often to use the bathroom.  Where can you learn more?  Go to https://www.[employer]net/patiented  Enter J942 in the search box to learn more about \"Learning About Sleeping Well.\"  Current as of: July 31, 2024  Content Version: 14.3    2024 [employer].   Care instructions adapted under license by your healthcare professional. If you have questions about a medical condition or this instruction, always ask your healthcare professional. [employer] disclaims any warranty or liability for your use of this information.       "